# Patient Record
Sex: FEMALE | Race: ASIAN | NOT HISPANIC OR LATINO
[De-identification: names, ages, dates, MRNs, and addresses within clinical notes are randomized per-mention and may not be internally consistent; named-entity substitution may affect disease eponyms.]

---

## 2017-02-28 ENCOUNTER — TRANSCRIPTION ENCOUNTER (OUTPATIENT)
Age: 32
End: 2017-02-28

## 2018-10-19 ENCOUNTER — TRANSCRIPTION ENCOUNTER (OUTPATIENT)
Age: 33
End: 2018-10-19

## 2018-12-05 ENCOUNTER — APPOINTMENT (OUTPATIENT)
Dept: UROGYNECOLOGY | Facility: CLINIC | Age: 33
End: 2018-12-05

## 2019-01-23 ENCOUNTER — TRANSCRIPTION ENCOUNTER (OUTPATIENT)
Age: 34
End: 2019-01-23

## 2019-04-19 ENCOUNTER — EMERGENCY (EMERGENCY)
Facility: HOSPITAL | Age: 34
LOS: 1 days | Discharge: HOME | End: 2019-04-19
Attending: EMERGENCY MEDICINE | Admitting: EMERGENCY MEDICINE
Payer: COMMERCIAL

## 2019-04-19 VITALS
OXYGEN SATURATION: 99 % | TEMPERATURE: 98 F | SYSTOLIC BLOOD PRESSURE: 129 MMHG | DIASTOLIC BLOOD PRESSURE: 84 MMHG | HEIGHT: 64 IN | WEIGHT: 162.92 LBS | RESPIRATION RATE: 18 BRPM | HEART RATE: 80 BPM

## 2019-04-19 PROCEDURE — 99284 EMERGENCY DEPT VISIT MOD MDM: CPT | Mod: 25

## 2019-04-20 VITALS
TEMPERATURE: 98 F | RESPIRATION RATE: 16 BRPM | HEART RATE: 62 BPM | DIASTOLIC BLOOD PRESSURE: 80 MMHG | OXYGEN SATURATION: 100 % | SYSTOLIC BLOOD PRESSURE: 127 MMHG

## 2019-04-20 LAB
ANION GAP SERPL CALC-SCNC: 10 MMOL/L — SIGNIFICANT CHANGE UP (ref 5–17)
BASOPHILS # BLD AUTO: 0.02 K/UL — SIGNIFICANT CHANGE UP (ref 0–0.2)
BASOPHILS NFR BLD AUTO: 0.3 % — SIGNIFICANT CHANGE UP (ref 0–2)
BUN SERPL-MCNC: 18 MG/DL — SIGNIFICANT CHANGE UP (ref 7–23)
CALCIUM SERPL-MCNC: 9.7 MG/DL — SIGNIFICANT CHANGE UP (ref 8.4–10.5)
CHLORIDE SERPL-SCNC: 103 MMOL/L — SIGNIFICANT CHANGE UP (ref 96–108)
CO2 SERPL-SCNC: 26 MMOL/L — SIGNIFICANT CHANGE UP (ref 22–31)
CREAT SERPL-MCNC: 0.72 MG/DL — SIGNIFICANT CHANGE UP (ref 0.5–1.3)
EOSINOPHIL # BLD AUTO: 0.06 K/UL — SIGNIFICANT CHANGE UP (ref 0–0.5)
EOSINOPHIL NFR BLD AUTO: 0.9 % — SIGNIFICANT CHANGE UP (ref 0–6)
GLUCOSE SERPL-MCNC: 103 MG/DL — HIGH (ref 70–99)
HCT VFR BLD CALC: 38.6 % — SIGNIFICANT CHANGE UP (ref 34.5–45)
HGB BLD-MCNC: 12.6 G/DL — SIGNIFICANT CHANGE UP (ref 11.5–15.5)
IMM GRANULOCYTES NFR BLD AUTO: 0.3 % — SIGNIFICANT CHANGE UP (ref 0–1.5)
LYMPHOCYTES # BLD AUTO: 2.39 K/UL — SIGNIFICANT CHANGE UP (ref 1–3.3)
LYMPHOCYTES # BLD AUTO: 37.3 % — SIGNIFICANT CHANGE UP (ref 13–44)
MCHC RBC-ENTMCNC: 30.5 PG — SIGNIFICANT CHANGE UP (ref 27–34)
MCHC RBC-ENTMCNC: 32.6 GM/DL — SIGNIFICANT CHANGE UP (ref 32–36)
MCV RBC AUTO: 93.5 FL — SIGNIFICANT CHANGE UP (ref 80–100)
MONOCYTES # BLD AUTO: 0.32 K/UL — SIGNIFICANT CHANGE UP (ref 0–0.9)
MONOCYTES NFR BLD AUTO: 5 % — SIGNIFICANT CHANGE UP (ref 2–14)
NEUTROPHILS # BLD AUTO: 3.59 K/UL — SIGNIFICANT CHANGE UP (ref 1.8–7.4)
NEUTROPHILS NFR BLD AUTO: 56.2 % — SIGNIFICANT CHANGE UP (ref 43–77)
NRBC # BLD: 0 /100 WBCS — SIGNIFICANT CHANGE UP (ref 0–0)
PLATELET # BLD AUTO: 451 K/UL — HIGH (ref 150–400)
POTASSIUM SERPL-MCNC: 4.4 MMOL/L — SIGNIFICANT CHANGE UP (ref 3.5–5.3)
POTASSIUM SERPL-SCNC: 4.4 MMOL/L — SIGNIFICANT CHANGE UP (ref 3.5–5.3)
RBC # BLD: 4.13 M/UL — SIGNIFICANT CHANGE UP (ref 3.8–5.2)
RBC # FLD: 13.2 % — SIGNIFICANT CHANGE UP (ref 10.3–14.5)
SODIUM SERPL-SCNC: 139 MMOL/L — SIGNIFICANT CHANGE UP (ref 135–145)
WBC # BLD: 6.4 K/UL — SIGNIFICANT CHANGE UP (ref 3.8–10.5)
WBC # FLD AUTO: 6.4 K/UL — SIGNIFICANT CHANGE UP (ref 3.8–10.5)

## 2019-04-20 PROCEDURE — 70450 CT HEAD/BRAIN W/O DYE: CPT

## 2019-04-20 PROCEDURE — 99284 EMERGENCY DEPT VISIT MOD MDM: CPT | Mod: 25

## 2019-04-20 PROCEDURE — 96374 THER/PROPH/DIAG INJ IV PUSH: CPT

## 2019-04-20 PROCEDURE — 80048 BASIC METABOLIC PNL TOTAL CA: CPT

## 2019-04-20 PROCEDURE — 36415 COLL VENOUS BLD VENIPUNCTURE: CPT

## 2019-04-20 PROCEDURE — 96375 TX/PRO/DX INJ NEW DRUG ADDON: CPT

## 2019-04-20 PROCEDURE — 70450 CT HEAD/BRAIN W/O DYE: CPT | Mod: 26

## 2019-04-20 PROCEDURE — 85025 COMPLETE CBC W/AUTO DIFF WBC: CPT

## 2019-04-20 RX ORDER — SODIUM CHLORIDE 9 MG/ML
1000 INJECTION INTRAMUSCULAR; INTRAVENOUS; SUBCUTANEOUS ONCE
Qty: 0 | Refills: 0 | Status: COMPLETED | OUTPATIENT
Start: 2019-04-20 | End: 2019-04-20

## 2019-04-20 RX ORDER — DIPHENHYDRAMINE HCL 50 MG
25 CAPSULE ORAL ONCE
Qty: 0 | Refills: 0 | Status: COMPLETED | OUTPATIENT
Start: 2019-04-20 | End: 2019-04-20

## 2019-04-20 RX ORDER — KETOROLAC TROMETHAMINE 30 MG/ML
30 SYRINGE (ML) INJECTION ONCE
Qty: 0 | Refills: 0 | Status: DISCONTINUED | OUTPATIENT
Start: 2019-04-20 | End: 2019-04-20

## 2019-04-20 RX ORDER — PROCHLORPERAZINE MALEATE 5 MG
10 TABLET ORAL ONCE
Qty: 0 | Refills: 0 | Status: COMPLETED | OUTPATIENT
Start: 2019-04-20 | End: 2019-04-20

## 2019-04-20 RX ORDER — ONDANSETRON 8 MG/1
4 TABLET, FILM COATED ORAL ONCE
Qty: 0 | Refills: 0 | Status: COMPLETED | OUTPATIENT
Start: 2019-04-20 | End: 2019-04-20

## 2019-04-20 RX ORDER — METOCLOPRAMIDE HCL 10 MG
10 TABLET ORAL ONCE
Qty: 0 | Refills: 0 | Status: COMPLETED | OUTPATIENT
Start: 2019-04-20 | End: 2019-04-20

## 2019-04-20 RX ADMIN — SODIUM CHLORIDE 1000 MILLILITER(S): 9 INJECTION INTRAMUSCULAR; INTRAVENOUS; SUBCUTANEOUS at 01:26

## 2019-04-20 RX ADMIN — ONDANSETRON 4 MILLIGRAM(S): 8 TABLET, FILM COATED ORAL at 01:10

## 2019-04-20 RX ADMIN — Medication 30 MILLIGRAM(S): at 02:46

## 2019-04-20 RX ADMIN — Medication 104 MILLIGRAM(S): at 04:27

## 2019-04-20 RX ADMIN — Medication 25 MILLIGRAM(S): at 02:47

## 2019-04-20 RX ADMIN — Medication 10 MILLIGRAM(S): at 01:24

## 2019-04-20 NOTE — ED PROVIDER NOTE - CLINICAL SUMMARY MEDICAL DECISION MAKING FREE TEXT BOX
Patient in ED w concern for HA - notes history of migraine HA.  No focal neuro deficits.  Non toxic.  5/5 strength x 4 extremities against gravity and external force.  CT head, basic labs completed.  Pt given zofran, compazine.  She just took motrin and is offered benadryl, however declines.  Following completion of my shift patient's care is handed over to oncoming night team pending CT scan and re eval for final disposition.  Patient is stable at time of transfer of care.

## 2019-04-20 NOTE — ED PROVIDER NOTE - OBJECTIVE STATEMENT
33 year old female with history of migraine HA presents to ED with concern for HA x 6 days.  Patient states she was recently diagnosed with migraine HAs associated with her menstrual cycle, stating she has not had any imaging completed to date since diagnosis several months ago.  She notes associated nausea, emesis and photophobia.  Patient states HA is similar to what she has experienced in the past, however she does not typically have symptoms that last this long.  She denies associated fever, chills, neck pain/stiffness, extremity weakness, chest pain, shortness of breath, nausea, emesis, changes to bowel movements or any additional acute complaints or concerns at this time.

## 2019-04-20 NOTE — ED ADULT NURSE NOTE - OBJECTIVE STATEMENT
34 y/o pt AOx3 arrived to the ED c/o having a headache since sunday after having traveled. Pt states she has been suffering from headaches for the past 2 months and they have been coming during her menstruation and normally go away once it's done, but during this episode it continues. Pt states she currently takes lexapro 20mg for the past 6yrs.

## 2019-04-20 NOTE — ED PROVIDER NOTE - CARE PROVIDER_API CALL
Yolie Cody)  Neurology; Vascular Neurology  [address], [address] [place] [address]  Phone: [phone]  Fax: [phone]  Follow Up Time:

## 2019-04-21 ENCOUNTER — EMERGENCY (EMERGENCY)
Facility: HOSPITAL | Age: 34
LOS: 1 days | Discharge: HOME | End: 2019-04-21
Attending: EMERGENCY MEDICINE | Admitting: EMERGENCY MEDICINE
Payer: COMMERCIAL

## 2019-04-21 VITALS
OXYGEN SATURATION: 100 % | WEIGHT: 162.04 LBS | TEMPERATURE: 98 F | HEART RATE: 84 BPM | DIASTOLIC BLOOD PRESSURE: 90 MMHG | SYSTOLIC BLOOD PRESSURE: 159 MMHG | RESPIRATION RATE: 16 BRPM

## 2019-04-21 VITALS
HEART RATE: 78 BPM | SYSTOLIC BLOOD PRESSURE: 119 MMHG | RESPIRATION RATE: 18 BRPM | OXYGEN SATURATION: 98 % | TEMPERATURE: 98 F | DIASTOLIC BLOOD PRESSURE: 79 MMHG

## 2019-04-21 DIAGNOSIS — R51 HEADACHE: ICD-10-CM

## 2019-04-21 DIAGNOSIS — R11.0 NAUSEA: ICD-10-CM

## 2019-04-21 DIAGNOSIS — Z88.1 ALLERGY STATUS TO OTHER ANTIBIOTIC AGENTS STATUS: ICD-10-CM

## 2019-04-21 PROCEDURE — 96375 TX/PRO/DX INJ NEW DRUG ADDON: CPT

## 2019-04-21 PROCEDURE — 99284 EMERGENCY DEPT VISIT MOD MDM: CPT

## 2019-04-21 PROCEDURE — 99284 EMERGENCY DEPT VISIT MOD MDM: CPT | Mod: 25

## 2019-04-21 PROCEDURE — 96365 THER/PROPH/DIAG IV INF INIT: CPT

## 2019-04-21 PROCEDURE — 96361 HYDRATE IV INFUSION ADD-ON: CPT

## 2019-04-21 RX ORDER — KETOROLAC TROMETHAMINE 30 MG/ML
15 SYRINGE (ML) INJECTION ONCE
Qty: 0 | Refills: 0 | Status: DISCONTINUED | OUTPATIENT
Start: 2019-04-21 | End: 2019-04-21

## 2019-04-21 RX ORDER — SODIUM CHLORIDE 9 MG/ML
1000 INJECTION INTRAMUSCULAR; INTRAVENOUS; SUBCUTANEOUS ONCE
Qty: 0 | Refills: 0 | Status: COMPLETED | OUTPATIENT
Start: 2019-04-21 | End: 2019-04-21

## 2019-04-21 RX ORDER — METOCLOPRAMIDE HCL 10 MG
10 TABLET ORAL ONCE
Qty: 0 | Refills: 0 | Status: COMPLETED | OUTPATIENT
Start: 2019-04-21 | End: 2019-04-21

## 2019-04-21 RX ORDER — DEXAMETHASONE 0.5 MG/5ML
10 ELIXIR ORAL ONCE
Qty: 0 | Refills: 0 | Status: COMPLETED | OUTPATIENT
Start: 2019-04-21 | End: 2019-04-21

## 2019-04-21 RX ORDER — DIPHENHYDRAMINE HCL 50 MG
25 CAPSULE ORAL ONCE
Qty: 0 | Refills: 0 | Status: COMPLETED | OUTPATIENT
Start: 2019-04-21 | End: 2019-04-21

## 2019-04-21 RX ADMIN — Medication 15 MILLIGRAM(S): at 21:08

## 2019-04-21 RX ADMIN — SODIUM CHLORIDE 2000 MILLILITER(S): 9 INJECTION INTRAMUSCULAR; INTRAVENOUS; SUBCUTANEOUS at 21:02

## 2019-04-21 RX ADMIN — Medication 10 MILLIGRAM(S): at 21:08

## 2019-04-21 RX ADMIN — SODIUM CHLORIDE 1000 MILLILITER(S): 9 INJECTION INTRAMUSCULAR; INTRAVENOUS; SUBCUTANEOUS at 21:56

## 2019-04-21 RX ADMIN — Medication 15 MILLIGRAM(S): at 21:37

## 2019-04-21 RX ADMIN — Medication 10 MILLIGRAM(S): at 22:39

## 2019-04-21 RX ADMIN — Medication 25 MILLIGRAM(S): at 21:07

## 2019-04-21 RX ADMIN — Medication 102 MILLIGRAM(S): at 21:56

## 2019-04-21 NOTE — ED PROVIDER NOTE - CLINICAL SUMMARY MEDICAL DECISION MAKING FREE TEXT BOX
recurrent headache.  recent visit for same with normal labs/ ct scan.  exam non focal.  suspect tension /migraine headache.  repeated reglan/ toradol/ benadryl with improvement.  decadron given for possible rebound prophylaxis.  discussed importance of outpatient f/u w neurologist, possible mri if persists.  return precautions discussed

## 2019-04-21 NOTE — ED ADULT NURSE NOTE - NSIMPLEMENTINTERV_GEN_ALL_ED
Implemented All Universal Safety Interventions:  [place] to call system. Call bell, personal items and telephone within reach. Instruct patient to call for assistance. Room bathroom lighting operational. Non-slip footwear when patient is off stretcher. Physically safe environment: no spills, clutter or unnecessary equipment. Stretcher in lowest position, wheels locked, appropriate side rails in place.

## 2019-04-21 NOTE — ED PROVIDER NOTE - CARE PROVIDER_API CALL
Margret Okeefe)  Neurology  [address], [address] [place] [address]  Phone: [phone]  Fax: [phone]  Follow Up Time:

## 2019-04-21 NOTE — ED PROVIDER NOTE - NEUROLOGICAL, MLM
Alert and oriented, no focal deficits, no motor or sensory deficits.  CN 2-12 intact, finger to nose normal, speech normal, rhomberg neg

## 2019-04-21 NOTE — ED PROVIDER NOTE - NSFOLLOWUPINSTRUCTIONS_ED_ALL_ED_FT
Please see referral to neurologist  Return to the ER if symptoms worsen or other concerns.    Headache    A headache is pain or discomfort felt around the head or neck area. The specific cause of a headache may not be found as there are many types including tension headaches, migraine headaches, and cluster headaches. Watch your condition for any changes. Things you can do to manage your pain include taking over the counter and prescription medications as instructed by your health care provider, lying down in a dark quiet room, limiting stress, getting regular sleep, and refraining from alcohol and tobacco products.    SEEK IMMEDIATE MEDICAL CARE IF YOU HAVE ANY OF THE FOLLOWING SYMPTOMS: fever, vomiting, stiff neck, loss of vision, problems with speech, muscle weakness, loss of balance, trouble walking, passing out, or confusion.

## 2019-04-21 NOTE — ED ADULT NURSE NOTE - CHPI ED NUR SYMPTOMS NEG
no dizziness/no fever/no nausea/no blurred vision/no numbness/no vomiting/no confusion/no loss of consciousness/no change in level of consciousness

## 2019-04-21 NOTE — ED ADULT TRIAGE NOTE - OTHER COMPLAINTS
headache x1 week. reports previously evaluated with CT. no numbness nor tingling, no facial droop, no slurring of speech, strength and sensation equal bilateral. no fever, no nuchal rigidity.

## 2019-04-21 NOTE — ED ADULT NURSE NOTE - OBJECTIVE STATEMENT
Pt w/o significant PMH presents to ED today c/o 10/10 HA x1 week.  Pt has been evaluated for same complaint this week and had CT performed w/ negative findings.  Pt denies falls, LOC, or N/V.  Pt has limited photophobia.  Pt is neurologically intact.  Pt pending treatments.

## 2019-04-21 NOTE — ED ADULT NURSE REASSESSMENT NOTE - NS ED NURSE REASSESS COMMENT FT1
Headache and symptoms resolved s/p meds, vital signs stable, discharged to home in stable condition.

## 2019-04-21 NOTE — ED PROVIDER NOTE - OBJECTIVE STATEMENT
here with persistent / recurrent headache.  Described as pressure from back of head to front.  Associated with nausea.  Has been present for a week.  Seen 2 days ago for same, treated with medicines which helped but then when waking next day headache recurred.  Denies trauma, fever/chills, vision changes, weakness.  Gradual onset.

## 2019-04-22 ENCOUNTER — INBOUND DOCUMENT (OUTPATIENT)
Age: 34
End: 2019-04-22

## 2019-04-23 DIAGNOSIS — Z88.1 ALLERGY STATUS TO OTHER ANTIBIOTIC AGENTS STATUS: ICD-10-CM

## 2019-04-23 DIAGNOSIS — R51 HEADACHE: ICD-10-CM

## 2019-04-23 DIAGNOSIS — R11.0 NAUSEA: ICD-10-CM

## 2019-04-23 DIAGNOSIS — H53.149 VISUAL DISCOMFORT, UNSPECIFIED: ICD-10-CM

## 2019-09-05 ENCOUNTER — INBOUND DOCUMENT (OUTPATIENT)
Age: 34
End: 2019-09-05

## 2019-09-05 ENCOUNTER — EMERGENCY (EMERGENCY)
Facility: HOSPITAL | Age: 34
LOS: 1 days | Discharge: HOME | End: 2019-09-05
Attending: EMERGENCY MEDICINE | Admitting: EMERGENCY MEDICINE
Payer: COMMERCIAL

## 2019-09-05 VITALS
HEART RATE: 76 BPM | TEMPERATURE: 98 F | HEIGHT: 64 IN | RESPIRATION RATE: 18 BRPM | DIASTOLIC BLOOD PRESSURE: 87 MMHG | WEIGHT: 152.56 LBS | SYSTOLIC BLOOD PRESSURE: 137 MMHG | OXYGEN SATURATION: 100 %

## 2019-09-05 PROCEDURE — 99284 EMERGENCY DEPT VISIT MOD MDM: CPT | Mod: 25

## 2019-09-05 PROCEDURE — 96374 THER/PROPH/DIAG INJ IV PUSH: CPT

## 2019-09-05 PROCEDURE — 99284 EMERGENCY DEPT VISIT MOD MDM: CPT

## 2019-09-05 PROCEDURE — 96375 TX/PRO/DX INJ NEW DRUG ADDON: CPT

## 2019-09-05 RX ORDER — PROCHLORPERAZINE MALEATE 5 MG
10 TABLET ORAL ONCE
Refills: 0 | Status: DISCONTINUED | OUTPATIENT
Start: 2019-09-05 | End: 2019-09-05

## 2019-09-05 RX ORDER — SODIUM CHLORIDE 9 MG/ML
1000 INJECTION INTRAMUSCULAR; INTRAVENOUS; SUBCUTANEOUS ONCE
Refills: 0 | Status: COMPLETED | OUTPATIENT
Start: 2019-09-05 | End: 2019-09-05

## 2019-09-05 RX ORDER — KETOROLAC TROMETHAMINE 30 MG/ML
30 SYRINGE (ML) INJECTION ONCE
Refills: 0 | Status: DISCONTINUED | OUTPATIENT
Start: 2019-09-05 | End: 2019-09-05

## 2019-09-05 RX ORDER — PROCHLORPERAZINE MALEATE 5 MG
10 TABLET ORAL ONCE
Refills: 0 | Status: COMPLETED | OUTPATIENT
Start: 2019-09-05 | End: 2019-09-05

## 2019-09-05 RX ORDER — ACETAMINOPHEN 500 MG
650 TABLET ORAL ONCE
Refills: 0 | Status: COMPLETED | OUTPATIENT
Start: 2019-09-05 | End: 2019-09-05

## 2019-09-05 RX ORDER — DIPHENHYDRAMINE HCL 50 MG
25 CAPSULE ORAL ONCE
Refills: 0 | Status: COMPLETED | OUTPATIENT
Start: 2019-09-05 | End: 2019-09-05

## 2019-09-05 RX ORDER — PROCHLORPERAZINE MALEATE 5 MG
1 TABLET ORAL
Qty: 6 | Refills: 0
Start: 2019-09-05 | End: 2019-09-06

## 2019-09-05 RX ADMIN — Medication 104 MILLIGRAM(S): at 15:43

## 2019-09-05 RX ADMIN — Medication 25 MILLIGRAM(S): at 15:35

## 2019-09-05 RX ADMIN — Medication 650 MILLIGRAM(S): at 15:34

## 2019-09-05 RX ADMIN — SODIUM CHLORIDE 2000 MILLILITER(S): 9 INJECTION INTRAMUSCULAR; INTRAVENOUS; SUBCUTANEOUS at 15:35

## 2019-09-05 RX ADMIN — Medication 30 MILLIGRAM(S): at 15:35

## 2019-09-05 NOTE — ED ADULT NURSE NOTE - NSIMPLEMENTINTERV_GEN_ALL_ED
Implemented All Universal Safety Interventions:  [place] to call system. Call bell, personal items and telephone within reach. Instruct patient to call for assistance. Room bathroom lighting operational. Non-slip footwear when patient is off stretcher. Physically safe environment: no spills, clutter or unnecessary equipment. Stretcher in lowest position, wheels locked, appropriate side rails in place.

## 2019-09-05 NOTE — ED PROVIDER NOTE - CLINICAL SUMMARY MEDICAL DECISION MAKING FREE TEXT BOX
34 y/o F with persistent headaches with imaging as above in HPI, will treat with IV medications and reevaluate. No focal neurological deficits.

## 2019-09-05 NOTE — ED ADULT NURSE NOTE - CHPI ED NUR SYMPTOMS NEG
no dizziness/no change in level of consciousness/no nausea/no blurred vision/no fever/no numbness/no vomiting/no loss of consciousness/no weakness/no confusion

## 2019-09-05 NOTE — ED PROVIDER NOTE - NSFOLLOWUPINSTRUCTIONS_ED_ALL_ED_FT
Acute Headache    WHAT YOU NEED TO KNOW:    An acute headache is pain or discomfort that starts suddenly and gets worse quickly. You may have an acute headache only when you feel stress or eat certain foods. Other acute headache pain can happen every day, and sometimes several times a day.     DISCHARGE INSTRUCTIONS:    Seek care immediately if:     You have severe pain.      You have numbness or weakness on one side of your face or body.      You have a headache that occurs after a blow to the head, a fall, or other trauma.       You have a headache, are forgetful or confused, or have trouble speaking.      You have a headache, stiff neck, and a fever.    Contact your healthcare provider if:     You have a constant headache and are vomiting.      You have a headache each day that does not get better, even after treatment.      You have changes in your headaches, or new symptoms that occur when you have a headache.      You have questions or concerns about your condition or care.    Medicines: You may need any of the following:     Prescription pain medicine may be given. The medicine your healthcare provider recommends will depend on the kind of headaches you have. You will need to take prescription headache medicines as directed to prevent a problem called rebound headache. These headaches happen with regular use of pain relievers for headache disorders.      NSAIDs, such as ibuprofen, help decrease swelling, pain, and fever. This medicine is available with or without a doctor's order. NSAIDs can cause stomach bleeding or kidney problems in certain people. If you take blood thinner medicine, always ask your healthcare provider if NSAIDs are safe for you. Always read the medicine label and follow directions.      Acetaminophen decreases pain and fever. It is available without a doctor's order. Ask how much to take and how often to take it. Follow directions. Read the labels of all other medicines you are using to see if they also contain acetaminophen, or ask your doctor or pharmacist. Acetaminophen can cause liver damage if not taken correctly. Do not use more than 3 grams (3,000 milligrams) total of acetaminophen in one day.       Antidepressants may be given for some kinds of headaches.       Take your medicine as directed. Contact your healthcare provider if you think your medicine is not helping or if you have side effects. Tell him or her if you are allergic to any medicine. Keep a list of the medicines, vitamins, and herbs you take. Include the amounts, and when and why you take them. Bring the list or the pill bottles to follow-up visits. Carry your medicine list with you in case of an emergency.    Manage your symptoms:     Apply heat or ice on the headache area. Use a heat or ice pack. For an ice pack, you can also put crushed ice in a plastic bag. Cover the pack or bag with a towel before you apply it to your skin. Ice and heat both help decrease pain, and heat also helps decrease muscle spasms. Apply heat for 20 to 30 minutes every 2 hours. Apply ice for 15 to 20 minutes every hour. Apply heat or ice for as long and for as many days as directed. You may alternate heat and ice.      Relax your muscles. Lie down in a comfortable position and close your eyes. Relax your muscles slowly. Start at your toes and work your way up your body.      Keep a record of your headaches. Write down when your headaches start and stop. Include your symptoms and what you were doing when the headache began. Record what you ate or drank for 24 hours before the headache started. Describe the pain and where it hurts. Keep track of what you did to treat your headache and if it worked.     Prevent an acute headache:     Avoid anything that triggers an acute headache. Examples include exposure to chemicals, going to high altitude, or not getting enough sleep. Create a regular sleep routine. Go to sleep at the same time and wake up at the same time each day. Do not use electronic devices before bedtime. These may trigger a headache or prevent you from sleeping well.      Do not smoke. Nicotine and other chemicals in cigarettes and cigars can trigger an acute headache or make it worse. Ask your healthcare provider for information if you currently smoke and need help to quit. E-cigarettes or smokeless tobacco still contain nicotine. Talk to your healthcare provider before you use these products.       Limit alcohol as directed. Alcohol can trigger an acute headache or make it worse. If you have cluster headaches, do not drink alcohol during an episode. For other types of headaches, ask your healthcare provider if it is safe for you to drink alcohol. Ask how much is safe for you to drink, and how often.      Exercise as directed. Exercise can reduce tension and help with headache pain. Aim for 30 minutes of physical activity on most days of the week. Your healthcare provider can help you create an exercise plan.      Eat a variety of healthy foods. Healthy foods include fruits, vegetables, low-fat dairy products, lean meats, fish, whole grains, and cooked beans. Your healthcare provider or dietitian can help you create meals plans if you need to avoid foods that trigger headaches.    Follow up with your healthcare provider as directed: Bring your headache record with you when you see your healthcare provider. Write down your questions so you remember to ask them during your visits.       © Copyright [employer] 2019 All illustrations and images included in CareNotes are the copyrighted property of [employer] or [employer].

## 2019-09-05 NOTE — ED PROVIDER NOTE - PATIENT PORTAL LINK FT
You can access the FollowMyHealth Patient Portal offered by [facility] by registering at the following website: [email or web address]. By joining [employer]’s FollowMyHealth portal, you will also be able to view your health information using other applications (apps) compatible with our system.

## 2019-09-05 NOTE — ED PROVIDER NOTE - CARE PROVIDER_API CALL
Yolie Cody)  Neurology; Vascular Neurology  [address], [address] [place] [address]  Phone: [phone]  Fax: [phone]  Follow Up Time:

## 2019-09-05 NOTE — ED ADULT NURSE NOTE - OBJECTIVE STATEMENT
Patient AOx4 c/o of posterior HA x 4 months with symptoms worsening---denies nausea/vomiting. No neuro deficits noted. Answering questions and following verbal commands appropriately. No slurred speech noted.

## 2019-09-05 NOTE — ED ADULT TRIAGE NOTE - CHIEF COMPLAINT QUOTE
chronic posterior HA and nothing is helpinig -- has seen neuro and had nerve block yesterday --- also tramadol is not helping --- associated nausea chronic posterior HA and nothing is helping -- has seen neuro and had nerve block yesterday --- also tramadol is not helping --- associated nausea

## 2019-09-05 NOTE — ED ADULT NURSE NOTE - CHIEF COMPLAINT QUOTE
chronic posterior HA and nothing is helping -- has seen neuro and had nerve block yesterday --- also tramadol is not helping --- associated nausea

## 2019-09-05 NOTE — ED PROVIDER NOTE - PROGRESS NOTE DETAILS
ambulating, in ED NAD, will rx few tablets compazine in interim as stated helped here, pt stated tried sumatriptan in past but did not help. Will  meds rx  by neurologist tomorrow in pharmacy.

## 2019-09-05 NOTE — ED PROVIDER NOTE - OBJECTIVE STATEMENT
34 y/o F with Hx of headache x 4 days, worked up at outpatient for c5-c6 Cervical radiculopathy, MRI done in May showing degenerative disease of C5-C6. Patient presents today with persistent headache, described as being in the back of her head and radiating forward. No associated fever, chills, neck pain, weakness, vision changes, nausea, vomiting, diarrhea. Prescribed Tramadol in the last month, up to 200mg/day, not relieving symptoms. Receives occipital nerve blocks every 3 weeks. Visited neurologist this morning, prescribed pregabalin and Metaxalone. Patient states that the nature of her headaches has been the same over the last 4 months, including today, but symptoms are worsening. 34 y/o F with Hx of headaches x 4 months, worked up as outpatient, dx of c5-c6 Cervical radiculopathy by MRI done in May showing degenerative disease of C5-C6. Patient presents today with persistent headache, described as being in the back of her head and radiating forward. No associated fever, chills, neck pain, weakness, vision changes, nausea, vomiting, diarrhea. Prescribed Tramadol in the last month, up to 200mg/day, not relieving symptoms. Receives occipital nerve blocks every 3 weeks. Visited neurologist this morning, prescribed pregabalin and Metaxalone. Patient states that the nature of her headaches has been the same over the last 4 months, including today.

## 2019-09-11 DIAGNOSIS — R51 HEADACHE: ICD-10-CM

## 2019-12-28 ENCOUNTER — EMERGENCY (EMERGENCY)
Facility: HOSPITAL | Age: 34
LOS: 1 days | Discharge: HOME | End: 2019-12-28
Attending: EMERGENCY MEDICINE | Admitting: EMERGENCY MEDICINE
Payer: COMMERCIAL

## 2019-12-28 VITALS
HEART RATE: 80 BPM | RESPIRATION RATE: 17 BRPM | SYSTOLIC BLOOD PRESSURE: 118 MMHG | TEMPERATURE: 98 F | OXYGEN SATURATION: 98 % | DIASTOLIC BLOOD PRESSURE: 82 MMHG

## 2019-12-28 VITALS
OXYGEN SATURATION: 100 % | SYSTOLIC BLOOD PRESSURE: 141 MMHG | HEIGHT: 64 IN | HEART RATE: 82 BPM | TEMPERATURE: 98 F | RESPIRATION RATE: 16 BRPM | DIASTOLIC BLOOD PRESSURE: 89 MMHG | WEIGHT: 153 LBS

## 2019-12-28 DIAGNOSIS — G43.909 MIGRAINE, UNSPECIFIED, NOT INTRACTABLE, WITHOUT STATUS MIGRAINOSUS: ICD-10-CM

## 2019-12-28 DIAGNOSIS — Z79.899 OTHER LONG TERM (CURRENT) DRUG THERAPY: ICD-10-CM

## 2019-12-28 DIAGNOSIS — Z88.8 ALLERGY STATUS TO OTHER DRUGS, MEDICAMENTS AND BIOLOGICAL SUBSTANCES STATUS: ICD-10-CM

## 2019-12-28 DIAGNOSIS — M54.2 CERVICALGIA: ICD-10-CM

## 2019-12-28 DIAGNOSIS — R51 HEADACHE: ICD-10-CM

## 2019-12-28 PROCEDURE — 99284 EMERGENCY DEPT VISIT MOD MDM: CPT

## 2019-12-28 PROCEDURE — 99284 EMERGENCY DEPT VISIT MOD MDM: CPT | Mod: 25

## 2019-12-28 PROCEDURE — 96375 TX/PRO/DX INJ NEW DRUG ADDON: CPT

## 2019-12-28 PROCEDURE — 96374 THER/PROPH/DIAG INJ IV PUSH: CPT

## 2019-12-28 PROCEDURE — 96361 HYDRATE IV INFUSION ADD-ON: CPT

## 2019-12-28 RX ORDER — SODIUM CHLORIDE 9 MG/ML
1000 INJECTION INTRAMUSCULAR; INTRAVENOUS; SUBCUTANEOUS ONCE
Refills: 0 | Status: COMPLETED | OUTPATIENT
Start: 2019-12-28 | End: 2019-12-28

## 2019-12-28 RX ORDER — DIPHENHYDRAMINE HCL 50 MG
50 CAPSULE ORAL ONCE
Refills: 0 | Status: COMPLETED | OUTPATIENT
Start: 2019-12-28 | End: 2019-12-28

## 2019-12-28 RX ORDER — METOCLOPRAMIDE HCL 10 MG
10 TABLET ORAL ONCE
Refills: 0 | Status: COMPLETED | OUTPATIENT
Start: 2019-12-28 | End: 2019-12-28

## 2019-12-28 RX ORDER — KETOROLAC TROMETHAMINE 30 MG/ML
30 SYRINGE (ML) INJECTION ONCE
Refills: 0 | Status: DISCONTINUED | OUTPATIENT
Start: 2019-12-28 | End: 2019-12-28

## 2019-12-28 RX ADMIN — SODIUM CHLORIDE 2000 MILLILITER(S): 9 INJECTION INTRAMUSCULAR; INTRAVENOUS; SUBCUTANEOUS at 22:14

## 2019-12-28 RX ADMIN — Medication 50 MILLIGRAM(S): at 22:15

## 2019-12-28 RX ADMIN — Medication 30 MILLIGRAM(S): at 22:41

## 2019-12-28 RX ADMIN — Medication 30 MILLIGRAM(S): at 22:15

## 2019-12-28 RX ADMIN — SODIUM CHLORIDE 1000 MILLILITER(S): 9 INJECTION INTRAMUSCULAR; INTRAVENOUS; SUBCUTANEOUS at 22:46

## 2019-12-28 RX ADMIN — Medication 10 MILLIGRAM(S): at 22:14

## 2019-12-28 NOTE — ED PROVIDER NOTE - PATIENT PORTAL LINK FT
You can access the FollowMyHealth Patient Portal offered by [facility] by registering at the following website: [email or web address]. By joining [employer]’s FollowMyHealth portal, you will also be able to view your health information using other applications (apps) compatible with our system.

## 2019-12-28 NOTE — ED PROVIDER NOTE - CARE PROVIDER_API CALL
Ricci Rees)  Neurology; Neuromuscular Medicine  [address], [address] [place] [address]  Phone: [phone]  Fax: [phone]  Follow Up Time:

## 2019-12-28 NOTE — ED ADULT NURSE NOTE - OBJECTIVE STATEMENT
Patient w/ Hx. of migraine headaches, c/o of 2 days migraine headache, states had on NOv. 22 MRI, negative results and on Nov. 29  Lumbar puncture done w/ CSF leak, no neuro deficits, w/ nausea, no vomitting.

## 2019-12-28 NOTE — ED ADULT NURSE REASSESSMENT NOTE - NS ED NURSE REASSESS COMMENT FT1
Patient a/oX 3, c/o of migraine type headache, w/ nausea, no vomitting, no neuro deficits, no photophobia.  Vital signs stable.  Right AC PIV #22 in place.  Adminsitered:  Bendadryl, Reglan, Toradol IVP, NSS 1 L bolus.  Disposition pending.

## 2019-12-28 NOTE — ED PROVIDER NOTE - NSFOLLOWUPINSTRUCTIONS_ED_ALL_ED_FT
General Headache Without Cause  A headache is pain or discomfort felt around the head or neck area. The specific cause of a headache may not be found. There are many causes and types of headaches. A few common ones are:  Tension headaches.Migraine headaches.Cluster headaches.Chronic daily headaches.Follow these instructions at home:  Watch your condition for any changes. Let your health care provider know about them. Take these steps to help with your condition:  Managing pain               Take over-the-counter and prescription medicines only as told by your health care provider.Lie down in a dark, quiet room when you have a headache.If directed, put ice on your head and neck area:  Put ice in a plastic bag.Place a towel between your skin and the bag.Leave the ice on for 20 minutes, 2–3 times per day.If directed, apply heat to the affected area. Use the heat source that your health care provider recommends, such as a moist heat pack or a heating pad.  Place a towel between your skin and the heat source.Leave the heat on for 20–30 minutes.Remove the heat if your skin turns bright red. This is especially important if you are unable to feel pain, heat, or cold. You may have a greater risk of getting burned.Keep lights dim if bright lights bother you or make your headaches worse.Eating and drinking     Eat meals on a regular schedule.If you drink alcohol:  Limit how much you use to:   0–1 drink a day for women. 0–2 drinks a day for men. Be aware of how much alcohol is in your drink. In the [place] one drink equals one 12 oz bottle of beer (355 mL), one 5 oz glass of wine (148 mL), or one 1½ oz glass of hard liquor (44 mL).Stop drinking caffeine, or decrease the amount of caffeine you drink.General instructions        Keep a headache journal to help find out what may trigger your headaches. For example, write down:  What you eat and drink.How much sleep you get.Any change to your diet or medicines.Try massage or other relaxation techniques.Limit stress.Sit up straight, and do not tense your muscles.Do not use any products that contain nicotine or tobacco, such as cigarettes, e-cigarettes, and chewing tobacco. If you need help quitting, ask your health care provider.Exercise regularly as told by your health care provider.Sleep on a regular schedule. Get 7–9 hours of sleep each night, or the amount recommended by your health care provider.Keep all follow-up visits as told by your health care provider. This is important.Contact a health care provider if:  Your symptoms are not helped by medicine.You have a headache that is different from the usual headache.You have nausea or you vomit.You have a fever.Get help right away if:  Your headache becomes severe quickly.Your headache gets worse after moderate to intense physical activity.You have repeated vomiting.You have a stiff neck.You have a loss of vision.You have problems with speech.You have pain in the eye or ear.You have muscular weakness or loss of muscle control.You lose your balance or have trouble walking.You feel faint or pass out.You have confusion.You have a seizure.Summary  A headache is pain or discomfort felt around the head or neck area.There are many causes and types of headaches. In some cases, the cause may not be found.Keep a headache journal to help find out what may trigger your headaches. Watch your condition for any changes. Let your health care provider know about them.Contact a health care provider if you have a headache that is different from the usual headache, or if your symptoms are not helped by medicine.Get help right away if your headache becomes severe, you vomit, you have a loss of vision, you lose your balance, or you have a seizure.This information is not intended to replace advice given to you by your health care provider. Make sure you discuss any questions you have with your health care provider.    Migraine Headache  A migraine headache is an intense, throbbing pain on one side or both sides of the head. Migraines may also cause other symptoms, such as nausea, vomiting, and sensitivity to light and noise.  What are the causes?  Doing or taking certain things may also trigger migraines, such as:  Alcohol.Smoking.Medicines, such as:  Medicine used to treat chest pain (nitroglycerine).Birth control pills.Estrogen pills.Certain blood pressure medicines.Aged cheeses, chocolate, or caffeine.Foods or drinks that contain nitrates, glutamate, aspartame, or tyramine.Physical activity.Other things that may trigger a migraine include:  Menstruation.Pregnancy.Hunger.Stress, lack of sleep, too much sleep, or fatigue.Weather changes.What increases the risk?  The following factors may make you more likely to experience migraine headaches:  Age. Risk increases with age.Family history of migraine headaches.Being .Depression and anxiety.Obesity.Being a woman.Having a hole in the heart (patent foramen ovale) or other heart problems.What are the signs or symptoms?  The main symptom of this condition is pulsating or throbbing pain. Pain may:  Happen in any area of the head, such as on one side or both sides.Interfere with daily activities.Get worse with physical activity.Get worse with exposure to bright lights or loud noises.Other symptoms may include:  Nausea.Vomiting.Dizziness.General sensitivity to bright lights, loud noises, or smells.Before you get a migraine, you may get warning signs that a migraine is developing (aura). An aura may include:  Seeing flashing lights or having blind spots.Seeing bright spots, halos, or zigzag lines.Having tunnel vision or blurred vision.Having numbness or a tingling feeling.Having trouble talking.Having muscle weakness.How is this diagnosed?  A migraine headache can be diagnosed based on:  Your symptoms.A physical exam.Tests, such as CT scan or MRI of the head. These imaging tests can help rule out other causes of headaches.Taking fluid from the spine (lumbar puncture) and analyzing it (cerebrospinal fluid analysis, or CSF analysis).How is this treated?  A migraine headache is usually treated with medicines that:  Relieve pain.Relieve nausea.Prevent migraines from coming back.Treatment may also include:  Acupuncture.Lifestyle changes like avoiding foods that trigger migraines.Follow these instructions at home:  Medicines     Take over-the-counter and prescription medicines only as told by your health care provider.Do not drive or use heavy machinery while taking prescription pain medicine.To prevent or treat constipation while you are taking prescription pain medicine, your health care provider may recommend that you:  Drink enough fluid to keep your urine clear or pale yellow.Take over-the-counter or prescription medicines.Eat foods that are high in fiber, such as fresh fruits and vegetables, whole grains, and beans.Limit foods that are high in fat and processed sugars, such as fried and sweet foods.Lifestyle     Avoid alcohol use.Do not use any products that contain nicotine or tobacco, such as cigarettes and e-cigarettes. If you need help quitting, ask your health care provider.Get at least 8 hours of sleep every night.Limit your stress.General instructions               Keep a journal to find out what may trigger your migraine headaches. For example, write down:  What you eat and drink.How much sleep you get.Any change to your diet or medicines.If you have a migraine:  Avoid things that make your symptoms worse, such as bright lights.It may help to lie down in a dark, quiet room.Do not drive or use heavy machinery.Ask your health care provider what activities are safe for you while you are experiencing symptoms.Keep all follow-up visits as told by your health care provider. This is important.Contact a health care provider if:  You develop symptoms that are different or more severe than your usual migraine symptoms.Get help right away if:  Your migraine becomes severe.You have a fever.You have a stiff neck.You have vision loss.Your muscles feel weak or like you cannot control them.You start to lose your balance often.You develop trouble walking.You faint.This information is not intended to replace advice given to you by your health care provider. Make sure you discuss any questions you have with your health care provider.

## 2019-12-28 NOTE — ED PROVIDER NOTE - OBJECTIVE STATEMENT
34y F with history of migraines, asthma, degenerative disc disease, and depression taking 60mg Cymbalta presents to the ED with complains of worsening migraine 2x days. Patient notes see primary care doctor who prescribed Lyrica 200mg, metaxalone 2400mg, Norvasc 5mg, tramadol PRN and LP for rule out CSF leak 11/2/19. Patient notes over the past two days no relief with any medications and ran out of tramadol. Patient states having an MRI 11/22/19 which was negative. Patient endorses headache 8/10, nausea, sensitivity to light, and neck pain. Denies fever, chills, chest pain, shortness of breath, leg swelling, or vision change. 34y F with history of migraines, asthma, degenerative disc disease, and depression taking 60mg Cymbalta presents to the ED with complains of worsening migraine 2x days. Patient notes see primary care doctor who prescribed Lyrica 200mg, metaxalone 2400mg, Norvasc 5mg, tramadol PRN and LP for rule out CSF leak 11/2/19. Patient notes over the past two days no relief with any medications and ran out of tramadol. Patient states having an MRI 11/22/19 which was negative for similar sx. Patient endorses headache 8/10, nausea, sensitivity to light, and neck pain similar to typical migraine per pt. Denies fever, chills, chest pain, shortness of breath, leg swelling, or vision change.

## 2019-12-28 NOTE — ED PROVIDER NOTE - CLINICAL SUMMARY MEDICAL DECISION MAKING FREE TEXT BOX
General headache, states similar to prior migraine, no f/c, neck pain, meningismus, gradual in onset, no focal neuro deficits, will symptomatically treat and reassess.

## 2019-12-28 NOTE — ED ADULT NURSE REASSESSMENT NOTE - NS ED NURSE REASSESS COMMENT FT1
Headache and symptoms improved s/p meds, NSS bolus, vital signs stable, discharged to home in stable condition.

## 2020-01-03 ENCOUNTER — INBOUND DOCUMENT (OUTPATIENT)
Age: 35
End: 2020-01-03

## 2020-02-03 ENCOUNTER — APPOINTMENT (OUTPATIENT)
Dept: RHEUMATOLOGY | Facility: CLINIC | Age: 35
End: 2020-02-03

## 2020-03-24 ENCOUNTER — TRANSCRIPTION ENCOUNTER (OUTPATIENT)
Age: 35
End: 2020-03-24

## 2020-04-14 ENCOUNTER — TRANSCRIPTION ENCOUNTER (OUTPATIENT)
Age: 35
End: 2020-04-14

## 2020-04-25 ENCOUNTER — MESSAGE (OUTPATIENT)
Age: 35
End: 2020-04-25

## 2020-05-05 LAB
SARS-COV-2 IGG SERPL IA-ACNC: <0.1 INDEX
SARS-COV-2 IGG SERPL QL IA: NEGATIVE

## 2020-05-06 ENCOUNTER — TRANSCRIPTION ENCOUNTER (OUTPATIENT)
Age: 35
End: 2020-05-06

## 2020-07-16 NOTE — ED ADULT NURSE NOTE - PAIN RATING/NUMBER SCALE (0-10): ACTIVITY
[de-identified] : CT scan of the head was performed on 2/17/16. \par There were chronic changes related to his prior injury. \par There was no acute pathology.\par 
8

## 2021-03-10 ENCOUNTER — TRANSCRIPTION ENCOUNTER (OUTPATIENT)
Age: 36
End: 2021-03-10

## 2021-03-17 ENCOUNTER — EMERGENCY (EMERGENCY)
Facility: HOSPITAL | Age: 36
LOS: 1 days | Discharge: HOME | End: 2021-03-17
Admitting: EMERGENCY MEDICINE
Payer: COMMERCIAL

## 2021-03-17 VITALS
SYSTOLIC BLOOD PRESSURE: 125 MMHG | HEART RATE: 79 BPM | DIASTOLIC BLOOD PRESSURE: 87 MMHG | OXYGEN SATURATION: 99 % | HEIGHT: 64 IN | RESPIRATION RATE: 14 BRPM | TEMPERATURE: 97 F

## 2021-03-17 DIAGNOSIS — Z20.822 CONTACT WITH AND (SUSPECTED) EXPOSURE TO COVID-19: ICD-10-CM

## 2021-03-17 DIAGNOSIS — Z88.2 ALLERGY STATUS TO SULFONAMIDES: ICD-10-CM

## 2021-03-17 LAB — SARS-COV-2 RNA SPEC QL NAA+PROBE: SIGNIFICANT CHANGE UP

## 2021-03-17 PROCEDURE — U0005: CPT

## 2021-03-17 PROCEDURE — U0003: CPT

## 2021-03-17 PROCEDURE — 99283 EMERGENCY DEPT VISIT LOW MDM: CPT

## 2021-03-17 PROCEDURE — 99282 EMERGENCY DEPT VISIT SF MDM: CPT

## 2021-03-17 NOTE — ED PROVIDER NOTE - OBJECTIVE STATEMENT
Patient is presenting to the Emergency Department with a request to have COVID-19 testing.  Denies symptoms, including cough, shortness of breath, fever, chills, bodyaches or sore throat. yes

## 2021-03-17 NOTE — ED PROVIDER NOTE - PATIENT PORTAL LINK FT
You can access the FollowMyHealth Patient Portal offered by [facility] by registering at the following website: [email or web address]. By joining [employer]’s FollowMyHealth portal, you will also be able to view your health information using other applications (apps) compatible with our system.

## 2021-06-07 ENCOUNTER — APPOINTMENT (OUTPATIENT)
Dept: INTERNAL MEDICINE | Facility: CLINIC | Age: 36
End: 2021-06-07
Payer: COMMERCIAL

## 2021-06-07 VITALS
SYSTOLIC BLOOD PRESSURE: 110 MMHG | OXYGEN SATURATION: 97 % | BODY MASS INDEX: 27.66 KG/M2 | HEART RATE: 71 BPM | HEIGHT: 65 IN | DIASTOLIC BLOOD PRESSURE: 76 MMHG | WEIGHT: 166 LBS | TEMPERATURE: 97.1 F

## 2021-06-07 PROCEDURE — 99072 ADDL SUPL MATRL&STAF TM PHE: CPT

## 2021-06-07 PROCEDURE — 99385 PREV VISIT NEW AGE 18-39: CPT

## 2021-06-07 PROCEDURE — 36415 COLL VENOUS BLD VENIPUNCTURE: CPT

## 2021-06-07 NOTE — PHYSICAL EXAM
[No Acute Distress] : no acute distress [Well Nourished] : well nourished [Well Developed] : well developed [Well-Appearing] : well-appearing [Normal Sclera/Conjunctiva] : normal sclera/conjunctiva [PERRL] : pupils equal round and reactive to light [EOMI] : extraocular movements intact [Normal Outer Ear/Nose] : the outer ears and nose were normal in appearance [Normal Oropharynx] : the oropharynx was normal [No JVD] : no jugular venous distention [No Lymphadenopathy] : no lymphadenopathy [Supple] : supple [Thyroid Normal, No Nodules] : the thyroid was normal and there were no nodules present [No Respiratory Distress] : no respiratory distress  [No Accessory Muscle Use] : no accessory muscle use [Clear to Auscultation] : lungs were clear to auscultation bilaterally [Normal Rate] : normal rate  [Regular Rhythm] : with a regular rhythm [Normal S1, S2] : normal S1 and S2 [No Murmur] : no murmur heard [Pedal Pulses Present] : the pedal pulses are present [No Edema] : there was no peripheral edema [Soft] : abdomen soft [Non Tender] : non-tender [Non-distended] : non-distended [No Masses] : no abdominal mass palpated [No HSM] : no HSM [Normal Bowel Sounds] : normal bowel sounds [Normal Posterior Cervical Nodes] : no posterior cervical lymphadenopathy [Normal Anterior Cervical Nodes] : no anterior cervical lymphadenopathy [No Spinal Tenderness] : no spinal tenderness [No CVA Tenderness] : no CVA  tenderness [No Joint Swelling] : no joint swelling [Grossly Normal Strength/Tone] : grossly normal strength/tone [No Rash] : no rash [Coordination Grossly Intact] : coordination grossly intact [No Focal Deficits] : no focal deficits [Normal Gait] : normal gait [Normal Affect] : the affect was normal [Normal Insight/Judgement] : insight and judgment were intact

## 2021-06-07 NOTE — HISTORY OF PRESENT ILLNESS
[FreeTextEntry1] : Wellness [de-identified] : 34 yo f with hx of elev lipids and boderline A1C in the past.  \par Has gained 10 lbs over the past year.\par Gets lightheaded at times\par On Lyrica for headaches. - gets them daily.  This was after a head trauma.  Follows with neuro.  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    \par Exercises 3d/week. - feels well exercising.  Eating well- but this year not as good.    \par ON rixalti and cymbalta for anxiety and depression- follows with psychiatry.   Depression runs in the family. goes once a month.   Also a therapist.  \par saw gyn - all ok- referred to derm for hair loss- tsh was normal. .

## 2021-06-07 NOTE — PLAN
[FreeTextEntry1] : wellness complete\par labs today\par Anxiety and depression\par  - f/up psych\par  continue two drug regimen\par \par Headaches\par - f/up neuro\par  continue  lyrica

## 2021-06-08 LAB
ALBUMIN SERPL ELPH-MCNC: 4.9 G/DL
ALP BLD-CCNC: 89 U/L
ALT SERPL-CCNC: 10 U/L
ANION GAP SERPL CALC-SCNC: 12 MMOL/L
AST SERPL-CCNC: 14 U/L
BASOPHILS # BLD AUTO: 0.04 K/UL
BASOPHILS NFR BLD AUTO: 0.5 %
BILIRUB SERPL-MCNC: 0.3 MG/DL
BUN SERPL-MCNC: 13 MG/DL
CALCIUM SERPL-MCNC: 9.7 MG/DL
CHLORIDE SERPL-SCNC: 102 MMOL/L
CHOLEST SERPL-MCNC: 213 MG/DL
CO2 SERPL-SCNC: 26 MMOL/L
CREAT SERPL-MCNC: 0.77 MG/DL
EOSINOPHIL # BLD AUTO: 0.03 K/UL
EOSINOPHIL NFR BLD AUTO: 0.4 %
ESTIMATED AVERAGE GLUCOSE: 103 MG/DL
GLUCOSE SERPL-MCNC: 82 MG/DL
HBA1C MFR BLD HPLC: 5.2 %
HCT VFR BLD CALC: 41.8 %
HDLC SERPL-MCNC: 85 MG/DL
HGB BLD-MCNC: 13.2 G/DL
IMM GRANULOCYTES NFR BLD AUTO: 0.2 %
LDLC SERPL CALC-MCNC: 110 MG/DL
LYMPHOCYTES # BLD AUTO: 1.79 K/UL
LYMPHOCYTES NFR BLD AUTO: 21 %
MAN DIFF?: NORMAL
MCHC RBC-ENTMCNC: 30.5 PG
MCHC RBC-ENTMCNC: 31.6 GM/DL
MCV RBC AUTO: 96.5 FL
MONOCYTES # BLD AUTO: 0.28 K/UL
MONOCYTES NFR BLD AUTO: 3.3 %
NEUTROPHILS # BLD AUTO: 6.36 K/UL
NEUTROPHILS NFR BLD AUTO: 74.6 %
NONHDLC SERPL-MCNC: 128 MG/DL
PLATELET # BLD AUTO: 455 K/UL
POTASSIUM SERPL-SCNC: 4.6 MMOL/L
PROT SERPL-MCNC: 7.4 G/DL
RBC # BLD: 4.33 M/UL
RBC # FLD: 12.9 %
SODIUM SERPL-SCNC: 139 MMOL/L
TRIGL SERPL-MCNC: 89 MG/DL
WBC # FLD AUTO: 8.52 K/UL

## 2021-07-14 ENCOUNTER — EMERGENCY (EMERGENCY)
Facility: HOSPITAL | Age: 36
LOS: 1 days | Discharge: HOME | End: 2021-07-14
Attending: EMERGENCY MEDICINE | Admitting: EMERGENCY MEDICINE
Payer: COMMERCIAL

## 2021-07-14 VITALS
TEMPERATURE: 98 F | SYSTOLIC BLOOD PRESSURE: 139 MMHG | OXYGEN SATURATION: 99 % | HEIGHT: 64 IN | WEIGHT: 164.91 LBS | HEART RATE: 78 BPM | DIASTOLIC BLOOD PRESSURE: 93 MMHG | RESPIRATION RATE: 18 BRPM

## 2021-07-14 VITALS — OXYGEN SATURATION: 100 % | RESPIRATION RATE: 18 BRPM

## 2021-07-14 DIAGNOSIS — G43.909 MIGRAINE, UNSPECIFIED, NOT INTRACTABLE, WITHOUT STATUS MIGRAINOSUS: ICD-10-CM

## 2021-07-14 DIAGNOSIS — F32.9 MAJOR DEPRESSIVE DISORDER, SINGLE EPISODE, UNSPECIFIED: ICD-10-CM

## 2021-07-14 DIAGNOSIS — R55 SYNCOPE AND COLLAPSE: ICD-10-CM

## 2021-07-14 DIAGNOSIS — J45.909 UNSPECIFIED ASTHMA, UNCOMPLICATED: ICD-10-CM

## 2021-07-14 DIAGNOSIS — Z88.2 ALLERGY STATUS TO SULFONAMIDES: ICD-10-CM

## 2021-07-14 LAB
ALBUMIN SERPL ELPH-MCNC: 4.4 G/DL — SIGNIFICANT CHANGE UP (ref 3.3–5)
ALP SERPL-CCNC: 85 U/L — SIGNIFICANT CHANGE UP (ref 40–120)
ALT FLD-CCNC: 13 U/L — SIGNIFICANT CHANGE UP (ref 10–45)
ANION GAP SERPL CALC-SCNC: 11 MMOL/L — SIGNIFICANT CHANGE UP (ref 5–17)
AST SERPL-CCNC: 21 U/L — SIGNIFICANT CHANGE UP (ref 10–40)
BASOPHILS # BLD AUTO: 0.03 K/UL — SIGNIFICANT CHANGE UP (ref 0–0.2)
BASOPHILS NFR BLD AUTO: 0.4 % — SIGNIFICANT CHANGE UP (ref 0–2)
BILIRUB SERPL-MCNC: 0.4 MG/DL — SIGNIFICANT CHANGE UP (ref 0.2–1.2)
BUN SERPL-MCNC: 14 MG/DL — SIGNIFICANT CHANGE UP (ref 7–23)
CALCIUM SERPL-MCNC: 9.5 MG/DL — SIGNIFICANT CHANGE UP (ref 8.4–10.5)
CHLORIDE SERPL-SCNC: 103 MMOL/L — SIGNIFICANT CHANGE UP (ref 96–108)
CO2 SERPL-SCNC: 25 MMOL/L — SIGNIFICANT CHANGE UP (ref 22–31)
CREAT SERPL-MCNC: 0.72 MG/DL — SIGNIFICANT CHANGE UP (ref 0.5–1.3)
EOSINOPHIL # BLD AUTO: 0.04 K/UL — SIGNIFICANT CHANGE UP (ref 0–0.5)
EOSINOPHIL NFR BLD AUTO: 0.5 % — SIGNIFICANT CHANGE UP (ref 0–6)
GLUCOSE SERPL-MCNC: 93 MG/DL — SIGNIFICANT CHANGE UP (ref 70–99)
HCT VFR BLD CALC: 38.9 % — SIGNIFICANT CHANGE UP (ref 34.5–45)
HGB BLD-MCNC: 12.5 G/DL — SIGNIFICANT CHANGE UP (ref 11.5–15.5)
IMM GRANULOCYTES NFR BLD AUTO: 0.1 % — SIGNIFICANT CHANGE UP (ref 0–1.5)
LYMPHOCYTES # BLD AUTO: 2.08 K/UL — SIGNIFICANT CHANGE UP (ref 1–3.3)
LYMPHOCYTES # BLD AUTO: 25.6 % — SIGNIFICANT CHANGE UP (ref 13–44)
MCHC RBC-ENTMCNC: 30 PG — SIGNIFICANT CHANGE UP (ref 27–34)
MCHC RBC-ENTMCNC: 32.1 GM/DL — SIGNIFICANT CHANGE UP (ref 32–36)
MCV RBC AUTO: 93.3 FL — SIGNIFICANT CHANGE UP (ref 80–100)
MONOCYTES # BLD AUTO: 0.34 K/UL — SIGNIFICANT CHANGE UP (ref 0–0.9)
MONOCYTES NFR BLD AUTO: 4.2 % — SIGNIFICANT CHANGE UP (ref 2–14)
NEUTROPHILS # BLD AUTO: 5.63 K/UL — SIGNIFICANT CHANGE UP (ref 1.8–7.4)
NEUTROPHILS NFR BLD AUTO: 69.2 % — SIGNIFICANT CHANGE UP (ref 43–77)
NRBC # BLD: 0 /100 WBCS — SIGNIFICANT CHANGE UP (ref 0–0)
PLATELET # BLD AUTO: 466 K/UL — HIGH (ref 150–400)
POTASSIUM SERPL-MCNC: 4.1 MMOL/L — SIGNIFICANT CHANGE UP (ref 3.5–5.3)
POTASSIUM SERPL-SCNC: 4.1 MMOL/L — SIGNIFICANT CHANGE UP (ref 3.5–5.3)
PROT SERPL-MCNC: 7.6 G/DL — SIGNIFICANT CHANGE UP (ref 6–8.3)
RBC # BLD: 4.17 M/UL — SIGNIFICANT CHANGE UP (ref 3.8–5.2)
RBC # FLD: 12.2 % — SIGNIFICANT CHANGE UP (ref 10.3–14.5)
SODIUM SERPL-SCNC: 139 MMOL/L — SIGNIFICANT CHANGE UP (ref 135–145)
TROPONIN T SERPL-MCNC: 0.01 NG/ML — SIGNIFICANT CHANGE UP (ref 0–0.01)
WBC # BLD: 8.13 K/UL — SIGNIFICANT CHANGE UP (ref 3.8–10.5)
WBC # FLD AUTO: 8.13 K/UL — SIGNIFICANT CHANGE UP (ref 3.8–10.5)

## 2021-07-14 PROCEDURE — 36415 COLL VENOUS BLD VENIPUNCTURE: CPT

## 2021-07-14 PROCEDURE — 99283 EMERGENCY DEPT VISIT LOW MDM: CPT

## 2021-07-14 PROCEDURE — 82962 GLUCOSE BLOOD TEST: CPT

## 2021-07-14 PROCEDURE — 80053 COMPREHEN METABOLIC PANEL: CPT

## 2021-07-14 PROCEDURE — 84484 ASSAY OF TROPONIN QUANT: CPT

## 2021-07-14 PROCEDURE — 85025 COMPLETE CBC W/AUTO DIFF WBC: CPT

## 2021-07-14 PROCEDURE — 99285 EMERGENCY DEPT VISIT HI MDM: CPT

## 2021-07-14 RX ORDER — SODIUM CHLORIDE 9 MG/ML
1000 INJECTION INTRAMUSCULAR; INTRAVENOUS; SUBCUTANEOUS ONCE
Refills: 0 | Status: COMPLETED | OUTPATIENT
Start: 2021-07-14 | End: 2021-07-14

## 2021-07-14 RX ADMIN — SODIUM CHLORIDE 1000 MILLILITER(S): 9 INJECTION INTRAMUSCULAR; INTRAVENOUS; SUBCUTANEOUS at 17:01

## 2021-07-14 NOTE — ED PROVIDER NOTE - PATIENT PORTAL LINK FT
You can access the FollowMyHealth Patient Portal offered by [facility] by registering at the following website: [email or web address]. By joining [employer]’s FollowMyHealth portal, you will also be able to view your health information using other applications (apps) compatible with our system.

## 2021-07-14 NOTE — ED PROVIDER NOTE - ATTENDING CONTRIBUTION TO CARE
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

## 2021-07-14 NOTE — ED PROVIDER NOTE - CLINICAL SUMMARY MEDICAL DECISION MAKING FREE TEXT BOX
36 yo female with h/o migraine HA, h/o head injury, depression, vertigo, chronic headaches x 2 years, present to ER c/o near syncope episode that she had today at work. Pt states she felt hot, lightheaded, dizzy, felt like her legs became weak and vision blurry and she almost fainted. Pt quickly sat down to prevent LOC. pt also admits that she is menstruating today and her flow is usually heavy. pt had no CP, SOB, nausea, vomiting or sweats. Symptoms resolved shortly but she was sent to Er for evaluation. pt well appearing, not orthostatic, has normal ECG-NSR, no ischemic changes, has normal labs and non focal Neuro Exam: Alert and oriented x 3, face symmetric and speech fluent. Strength 5/5 x 4 ext and symmetric, nml gross motor movement, nml gait. No focal deficits noted.; suspect vasovagal episode.  results discussed with pt. she is seeking discharge home. Pt has neurologist and will f/u with her doctor. d/c home comfortable.

## 2021-07-14 NOTE — ED ADULT NURSE NOTE - OBJECTIVE STATEMENT
Pt is a 34 y/o female A&Ox4 in NAD ambulatory with steady gait c/o near syncopal episode. Pt is an L&D RN, was working at the time. Pt denies LOC, fall, head trauma, N/V/D, dizziness, chest pain. Pt currently on menstrual period, endorses "today was heavier than usual." , EKG done and signed. Pt talking in clear, full sentences, respirations even and unlabored. Pt is a 34 y/o female A&Ox4 in NAD ambulatory with steady gait c/o near syncopal episode. Pt is an L&D RN, was working at the time. Pt denies LOC, fall, head trauma, N/V/D, dizziness, chest pain. Pt currently menstruating, endorses "today was heavier than usual." , EKG done and signed. Pt talking in clear, full sentences, respirations even and unlabored.

## 2021-07-14 NOTE — ED PROVIDER NOTE - MUSCULOSKELETAL, MLM
Pt said he is totally out of the medication. Asking for refill asap   Spine and all extremities grossly appears normal, range of motion is not limited, no muscle or joint tenderness

## 2021-07-14 NOTE — ED PROVIDER NOTE - NSFOLLOWUPINSTRUCTIONS_ED_ALL_ED_FT
Near-Syncope    Near-syncope is when you suddenly get weak or dizzy, or you feel like you might pass out (faint). This may also be called presyncope. This is due to a lack of blood flow to the brain. During an episode of near-syncope, you may:  •Feel dizzy, weak, or light-headed.      •Feel sick to your stomach (nauseous).      •See all white or all black.      •See spots.      •Have cold, clammy skin.      This condition is caused by a sudden decrease in blood flow to the brain. This decrease can result from various causes, but most of those causes are not dangerous. However, near-syncope may be a sign of a serious medical problem, so it is important to seek medical care.      Follow these instructions at home:    Medicines     •Take over-the-counter and prescription medicines only as told by your doctor.      •If you are taking blood pressure or heart medicine, get up slowly and spend many minutes getting ready to sit and then stand. This can help with dizziness.      General instructions     •Be aware of any changes in your symptoms.      •Talk with your doctor about your symptoms. You may need to have testing to find the cause of your near-syncope.      •If you start to feel like you might pass out, lie down right away. Raise (elevate) your feet above the level of your heart. Breathe deeply and steadily. Wait until all of the symptoms are gone.      •Have someone stay with you until you feel stable.      • Do not drive, use machinery, or play sports until your doctor says it is okay.      •Drink enough fluid to keep your pee (urine) pale yellow.      •Keep all follow-up visits as told by your doctor. This is important.        Get help right away if you:    •Have a seizure.    •Have pain in your:  •Chest.      •Belly (abdomen).      •Back.        •Faint once or more than once.      •Have a very bad headache.      •Are bleeding from your mouth or butt.      •Have black or tarry poop (stool).      •Have a very fast or uneven heartbeat (palpitations).      •Are mixed up (confused).      •Have trouble walking.      •Are very weak.      •Have trouble seeing.      These symptoms may be an emergency. Do not wait to see if the symptoms will go away. Get medical help right away. Call your local emergency services (911 in the [place] Do not drive yourself to the hospital.       Summary    •Near-syncope is when you suddenly get weak or dizzy, or you feel like you might pass out (faint).      •This condition is caused by a lack of blood flow to the brain.      •Near-syncope may be a sign of a serious medical problem, so it is important to seek medical care.      This information is not intended to replace advice given to you by your health care provider. Make sure you discuss any questions you have with your health care provider.

## 2021-07-14 NOTE — ED PROVIDER NOTE - OBJECTIVE STATEMENT
Patient is a 36 y/o female with PMHx of chronic migraines, depression, asthma and vertigo presenting with a syncopal episode that occurred 1 hour ago at work. 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 Pt states that she was diagnosed with vertigo 3 weeks ago by her PCP and the symptoms(dizziness, room spinning sensation) have progressively gotten worse and more frequent, occurring multiple times a day.  Pt recalls eating an hour prior to the episode and has stayed hydrated. Pt reports having a similar syncopal episode a few months ago with no known cause. Patient is a 36 y/o female with PMHx of chronic headaches, migraines, depression, asthma and vertigo presenting with a syncopal episode that occurred 2 hours ago at work. 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 Pt states that she was diagnosed with vertigo 3 weeks ago by her PCP and the symptoms(dizziness, room spinning sensation) have progressively gotten worse and more frequent, occurring multiple times a day and does not take medication for it. Pt recalls eating an hour prior to the episode and adequate hydration. Pt states she is currently on day 2 of her menstrual period and experienced heavy bleeding this morning with several saturated pads, which is consistent for her cycle. She reports a single head trauma incident in 2019 with normal CT but resulted in chronic, daily headaches. Pt reports having a similar syncopal episode a few months ago with no known cause. Denies fever/chills, SOB, chest pain, abd pain, LOC, tinnitus or recent head trauma,

## 2021-10-27 ENCOUNTER — APPOINTMENT (OUTPATIENT)
Dept: INTERNAL MEDICINE | Facility: CLINIC | Age: 36
End: 2021-10-27
Payer: COMMERCIAL

## 2021-10-27 DIAGNOSIS — L03.019 CELLULITIS OF UNSPECIFIED FINGER: ICD-10-CM

## 2021-10-27 PROCEDURE — 99213 OFFICE O/P EST LOW 20 MIN: CPT | Mod: 95

## 2021-10-27 NOTE — PHYSICAL EXAM
[Normal] : no acute distress, well nourished, well developed and well-appearing [de-identified] : erythema and sweling with white pustular discharge

## 2021-10-27 NOTE — HISTORY OF PRESENT ILLNESS
[Home] : at home, [unfilled] , at the time of the visit. [Medical Office: ([facility])___] : at the medical office located in  [Verbal consent obtained from patient] : the patient, [unfilled] [de-identified] : develped swelliing after puling a hangnail  and d/c from her thumb\par no fever or chills.\par

## 2021-12-02 ENCOUNTER — NON-APPOINTMENT (OUTPATIENT)
Age: 36
End: 2021-12-02

## 2021-12-02 ENCOUNTER — APPOINTMENT (OUTPATIENT)
Dept: UROLOGY | Facility: CLINIC | Age: 36
End: 2021-12-02
Payer: COMMERCIAL

## 2021-12-02 VITALS — SYSTOLIC BLOOD PRESSURE: 122 MMHG | DIASTOLIC BLOOD PRESSURE: 74 MMHG | TEMPERATURE: 97.8 F | HEART RATE: 87 BPM

## 2021-12-02 DIAGNOSIS — R35.0 FREQUENCY OF MICTURITION: ICD-10-CM

## 2021-12-02 DIAGNOSIS — N39.41 URGE INCONTINENCE: ICD-10-CM

## 2021-12-02 LAB
BILIRUB UR QL STRIP: NORMAL
CLARITY UR: CLEAR
COLLECTION METHOD: NORMAL
GLUCOSE UR-MCNC: NORMAL
HCG UR QL: 0.2 EU/DL
HGB UR QL STRIP.AUTO: NORMAL
KETONES UR-MCNC: NORMAL
LEUKOCYTE ESTERASE UR QL STRIP: NORMAL
NITRITE UR QL STRIP: NORMAL
PH UR STRIP: 7
PROT UR STRIP-MCNC: NORMAL
SP GR UR STRIP: 1.02

## 2021-12-02 PROCEDURE — 51798 US URINE CAPACITY MEASURE: CPT

## 2021-12-02 PROCEDURE — 99204 OFFICE O/P NEW MOD 45 MIN: CPT

## 2021-12-02 PROCEDURE — 81003 URINALYSIS AUTO W/O SCOPE: CPT | Mod: QW

## 2021-12-02 NOTE — ASSESSMENT
[FreeTextEntry1] : 37 yo female with urinary symptoms consistent with OAB.  We discussed treatment options including, life style modification(reducing caffeine and alcohol intake) as well as medical therapy (anticholinergic and beta-3 agonists), intravesical botox and neuromodulation.  We discussed potential side effects of anticholinergic and beta-3 agonist medications.  She would like to try life style modification as well as a month trial of Myrbetriq 50 mg daily.  She will return in 1 month for follow up.  \par \par Plan:\par 1. Decrease caffeine and alcohol intake\par 2. Myrbetriq 50 mg daily\par 3. RTC in 1 month

## 2021-12-02 NOTE — HISTORY OF PRESENT ILLNESS
[FreeTextEntry1] : 35 yo female with several month history of urinary urgency, frequency, and episodes of urge incontinence.  She will typically leak at least once a day.  She typically goes through one pad per day.  She denies dysuria, hematuria, or nocturia.  SHe also notes occasional GUNNAR although this is typically when she has been holding in her urine for long periods of time.  She notes worse urge symptoms when she drinks coffee.  She notes that she had similar but less severe symptoms in 2018 at which time she underwent urodynamics which, per the patient, did not show evidence of OAB, and she was started on a course of VESIcare which did not help.  \par \par Urine dip (-)\par PVR = 0 cc

## 2021-12-19 ENCOUNTER — TRANSCRIPTION ENCOUNTER (OUTPATIENT)
Age: 36
End: 2021-12-19

## 2021-12-24 ENCOUNTER — TRANSCRIPTION ENCOUNTER (OUTPATIENT)
Age: 36
End: 2021-12-24

## 2022-01-09 ENCOUNTER — RESULT REVIEW (OUTPATIENT)
Age: 37
End: 2022-01-09

## 2022-02-09 ENCOUNTER — TRANSCRIPTION ENCOUNTER (OUTPATIENT)
Age: 37
End: 2022-02-09

## 2022-04-27 ENCOUNTER — NON-APPOINTMENT (OUTPATIENT)
Age: 37
End: 2022-04-27

## 2022-04-28 ENCOUNTER — APPOINTMENT (OUTPATIENT)
Dept: PSYCHIATRY | Facility: CLINIC | Age: 37
End: 2022-04-28
Payer: COMMERCIAL

## 2022-04-28 PROCEDURE — 99205 OFFICE O/P NEW HI 60 MIN: CPT

## 2022-04-28 RX ORDER — BREXPIPRAZOLE 1 MG/1
1 TABLET ORAL DAILY
Qty: 1 | Refills: 0 | Status: DISCONTINUED | COMMUNITY
Start: 2021-06-07 | End: 2022-04-28

## 2022-05-16 ENCOUNTER — APPOINTMENT (OUTPATIENT)
Dept: PSYCHIATRY | Facility: CLINIC | Age: 37
End: 2022-05-16

## 2022-05-20 ENCOUNTER — APPOINTMENT (OUTPATIENT)
Dept: INTERNAL MEDICINE | Facility: CLINIC | Age: 37
End: 2022-05-20
Payer: COMMERCIAL

## 2022-05-20 VITALS
HEIGHT: 64 IN | SYSTOLIC BLOOD PRESSURE: 122 MMHG | OXYGEN SATURATION: 98 % | WEIGHT: 175 LBS | TEMPERATURE: 97.6 F | DIASTOLIC BLOOD PRESSURE: 76 MMHG | HEART RATE: 68 BPM | BODY MASS INDEX: 29.88 KG/M2

## 2022-05-20 PROCEDURE — 99395 PREV VISIT EST AGE 18-39: CPT

## 2022-05-20 PROCEDURE — 36415 COLL VENOUS BLD VENIPUNCTURE: CPT

## 2022-05-20 RX ORDER — MIRABEGRON 50 MG/1
50 TABLET, FILM COATED, EXTENDED RELEASE ORAL
Qty: 30 | Refills: 1 | Status: DISCONTINUED | COMMUNITY
Start: 2021-12-02 | End: 2022-05-20

## 2022-05-20 RX ORDER — CEPHALEXIN 500 MG/1
500 TABLET ORAL 3 TIMES DAILY
Qty: 15 | Refills: 0 | Status: DISCONTINUED | COMMUNITY
Start: 2021-10-27 | End: 2022-05-20

## 2022-05-20 NOTE — REVIEW OF SYSTEMS
[Fatigue] : fatigue [Headache] : headache [Anxiety] : anxiety [Depression] : depression [Negative] : Heme/Lymph

## 2022-05-21 LAB
ALBUMIN SERPL ELPH-MCNC: 4.6 G/DL
ALP BLD-CCNC: 94 U/L
ALT SERPL-CCNC: 18 U/L
ANION GAP SERPL CALC-SCNC: 11 MMOL/L
AST SERPL-CCNC: 21 U/L
BASOPHILS # BLD AUTO: 0.02 K/UL
BASOPHILS NFR BLD AUTO: 0.2 %
BILIRUB SERPL-MCNC: 0.4 MG/DL
BUN SERPL-MCNC: 13 MG/DL
CALCIUM SERPL-MCNC: 9.7 MG/DL
CHLORIDE SERPL-SCNC: 105 MMOL/L
CHOLEST SERPL-MCNC: 205 MG/DL
CO2 SERPL-SCNC: 25 MMOL/L
CREAT SERPL-MCNC: 0.69 MG/DL
EGFR: 115 ML/MIN/1.73M2
EOSINOPHIL # BLD AUTO: 0.04 K/UL
EOSINOPHIL NFR BLD AUTO: 0.5 %
ESTIMATED AVERAGE GLUCOSE: 108 MG/DL
FERRITIN SERPL-MCNC: 25 NG/ML
GLUCOSE SERPL-MCNC: 88 MG/DL
HBA1C MFR BLD HPLC: 5.4 %
HCT VFR BLD CALC: 41.8 %
HDLC SERPL-MCNC: 63 MG/DL
HGB BLD-MCNC: 13.1 G/DL
IMM GRANULOCYTES NFR BLD AUTO: 0.2 %
IRON SATN MFR SERPL: 44 %
IRON SERPL-MCNC: 172 UG/DL
LDLC SERPL CALC-MCNC: 116 MG/DL
LYMPHOCYTES # BLD AUTO: 1.87 K/UL
LYMPHOCYTES NFR BLD AUTO: 23 %
MAN DIFF?: NORMAL
MCHC RBC-ENTMCNC: 30 PG
MCHC RBC-ENTMCNC: 31.3 GM/DL
MCV RBC AUTO: 95.7 FL
MONOCYTES # BLD AUTO: 0.33 K/UL
MONOCYTES NFR BLD AUTO: 4.1 %
NEUTROPHILS # BLD AUTO: 5.86 K/UL
NEUTROPHILS NFR BLD AUTO: 72 %
NONHDLC SERPL-MCNC: 142 MG/DL
PLATELET # BLD AUTO: 494 K/UL
POTASSIUM SERPL-SCNC: 4.4 MMOL/L
PROT SERPL-MCNC: 7.3 G/DL
RBC # BLD: 4.37 M/UL
RBC # FLD: 13.2 %
SODIUM SERPL-SCNC: 141 MMOL/L
TIBC SERPL-MCNC: 394 UG/DL
TRIGL SERPL-MCNC: 130 MG/DL
TSH SERPL-ACNC: 1.4 UIU/ML
UIBC SERPL-MCNC: 222 UG/DL
WBC # FLD AUTO: 8.14 K/UL

## 2022-05-21 NOTE — PHYSICAL EXAM
[Normal] : no acute distress, well nourished, well developed and well-appearing [de-identified] : erythema and sweling with white pustular discharge

## 2022-05-21 NOTE — PLAN
[FreeTextEntry1] : wellness complete\par labs today\par binge eating -will follow up wth therapsiit\par Fatigue - check labs today\par HSV2 - reasuurance given\par SHANNAN eval -info given for Dr Knight\par Migraines - f/up neurology

## 2022-05-21 NOTE — HISTORY OF PRESENT ILLNESS
[FreeTextEntry1] : Wellness [de-identified] : 37 yo f here for wellness\par migraines worse\par  covid in December - migraines after that - weekly then daily-  was out for a week - seeing her neurologist\par on pregabalin 200 only now.\par last year on a eating disorder - was on vyvanse - binge eating disorder - following with psych - then changed to cymbalta and then abck to lexapro.  \par ztill with depressive episodes.  \par menstruation is regular.  \par now on wellbutrin as well.- but has developed brain fog.  making mistakes at work. 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 saw derm - told it will go away 1-2 years.  - going to fllow up again.  \par recurrent bv - tok flagyl twice..  \par snoring - has woken herself up.  \par

## 2022-05-26 ENCOUNTER — APPOINTMENT (OUTPATIENT)
Dept: PSYCHIATRY | Facility: CLINIC | Age: 37
End: 2022-05-26
Payer: COMMERCIAL

## 2022-05-26 DIAGNOSIS — G89.29 HEADACHE, UNSPECIFIED: ICD-10-CM

## 2022-05-26 DIAGNOSIS — R51.9 HEADACHE, UNSPECIFIED: ICD-10-CM

## 2022-05-26 PROCEDURE — 99214 OFFICE O/P EST MOD 30 MIN: CPT | Mod: 95

## 2022-05-26 RX ORDER — BUPROPION HYDROCHLORIDE 150 MG/1
150 TABLET, EXTENDED RELEASE ORAL
Qty: 30 | Refills: 0 | Status: DISCONTINUED | COMMUNITY
Start: 2022-04-28 | End: 2022-05-26

## 2022-06-19 ENCOUNTER — NON-APPOINTMENT (OUTPATIENT)
Age: 37
End: 2022-06-19

## 2022-07-05 RX ORDER — ALBUTEROL SULFATE 90 UG/1
108 (90 BASE) INHALANT RESPIRATORY (INHALATION) EVERY 4 HOURS
Qty: 1 | Refills: 11 | Status: ACTIVE | COMMUNITY
Start: 2021-12-22 | End: 1900-01-01

## 2022-08-24 ENCOUNTER — APPOINTMENT (OUTPATIENT)
Dept: PULMONOLOGY | Facility: CLINIC | Age: 37
End: 2022-08-24

## 2022-08-24 VITALS
BODY MASS INDEX: 30.22 KG/M2 | SYSTOLIC BLOOD PRESSURE: 120 MMHG | TEMPERATURE: 97.9 F | DIASTOLIC BLOOD PRESSURE: 83 MMHG | HEART RATE: 72 BPM | WEIGHT: 177 LBS | HEIGHT: 64 IN | OXYGEN SATURATION: 97 % | RESPIRATION RATE: 12 BRPM

## 2022-08-24 DIAGNOSIS — G47.19 OTHER HYPERSOMNIA: ICD-10-CM

## 2022-08-24 PROCEDURE — 99204 OFFICE O/P NEW MOD 45 MIN: CPT

## 2022-08-24 NOTE — REVIEW OF SYSTEMS
[EDS: ESS=____] : daytime somnolence: ESS=[unfilled] [Fatigue] : fatigue [Recent Wt Gain (___ Lbs)] : recent [unfilled] ~Ulb weight gain [Snoring] : snoring [Obesity] : obesity [Depression] : depression [Negative] : Musculoskeletal

## 2022-08-24 NOTE — PHYSICAL EXAM
[General Appearance - In No Acute Distress] : no acute distress [Normal Conjunctiva] : the conjunctiva exhibited no abnormalities [Normal Oropharynx] : normal oropharynx [Neck Appearance] : the appearance of the neck was normal [Neck Cervical Mass (___cm)] : no neck mass was observed [Apical Impulse] : the apical impulse was normal [Heart Sounds] : normal S1 and S2 [] : no respiratory distress [Exaggerated Use Of Accessory Muscles For Inspiration] : no accessory muscle use [Abnormal Walk] : normal gait [Involuntary Movements] : no involuntary movements were seen [No Focal Deficits] : no focal deficits [Oriented To Time, Place, And Person] : oriented to person, place, and time [Affect] : the affect was normal [Mood] : the mood was normal

## 2022-08-25 ENCOUNTER — APPOINTMENT (OUTPATIENT)
Dept: PSYCHIATRY | Facility: CLINIC | Age: 37
End: 2022-08-25

## 2022-08-25 PROCEDURE — 99214 OFFICE O/P EST MOD 30 MIN: CPT | Mod: 95

## 2022-08-25 NOTE — HISTORY OF PRESENT ILLNESS
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 \par \par She was referred by her PCP. She reports snoring, but denies witnessed apnea.  Has  gained 20lb since 2020. She wakes up with morning HA and dry mouth and dozes off easily during the day.  \par \par Father with possible SHANNAN but never was treated. \par \par \par Sleep Routine:\par \par She goes to bed at 11, sleep latency is about 30 , she wakes up 2x/night, WASO is about 10 min, and then she is up at 5 A on work days and 10 a on off days. She naps everyday 3 hours. Sleepy, with Friant sleepiness scale (out of 24 points) =  13\par \par She denies cataplexy, RLS, parasomnia, or any other sleep behavioral issues.

## 2022-08-25 NOTE — ASSESSMENT
[FreeTextEntry1] : 37 y/o F with c/o snoring and excessive day time sleepiness who is here for initial evaluation of possible sleep apnea. Sleep  complicated by work (12h shifts), hx depression and treatment for depression, but sleep disordered breathing appears likely.  Discussed sleep hygiene, constant wake time might improve sleep quality.   Overnight overnight polysomnography ordered.

## 2022-10-03 ENCOUNTER — NON-APPOINTMENT (OUTPATIENT)
Age: 37
End: 2022-10-03

## 2023-01-18 NOTE — ED PROVIDER NOTE - CHIEF COMPLAINT
The patient is a 33y Female complaining of headache. Cosentyx Pregnancy And Lactation Text: This medication is Pregnancy Category B and is considered safe during pregnancy. It is unknown if this medication is excreted in breast milk.

## 2023-05-03 ENCOUNTER — NON-APPOINTMENT (OUTPATIENT)
Age: 38
End: 2023-05-03

## 2023-05-08 ENCOUNTER — TRANSCRIPTION ENCOUNTER (OUTPATIENT)
Age: 38
End: 2023-05-08

## 2023-05-09 ENCOUNTER — APPOINTMENT (OUTPATIENT)
Age: 38
End: 2023-05-09
Payer: COMMERCIAL

## 2023-05-09 VITALS
HEIGHT: 64 IN | HEART RATE: 96 BPM | SYSTOLIC BLOOD PRESSURE: 127 MMHG | RESPIRATION RATE: 12 BRPM | BODY MASS INDEX: 33.12 KG/M2 | TEMPERATURE: 98.1 F | WEIGHT: 194 LBS | OXYGEN SATURATION: 99 % | DIASTOLIC BLOOD PRESSURE: 83 MMHG

## 2023-05-09 DIAGNOSIS — Z00.00 ENCOUNTER FOR GENERAL ADULT MEDICAL EXAMINATION W/OUT ABNORMAL FINDINGS: ICD-10-CM

## 2023-05-09 DIAGNOSIS — R06.83 SNORING: ICD-10-CM

## 2023-05-09 PROCEDURE — 36415 COLL VENOUS BLD VENIPUNCTURE: CPT

## 2023-05-09 PROCEDURE — 99395 PREV VISIT EST AGE 18-39: CPT

## 2023-05-09 NOTE — PLAN
[FreeTextEntry1] : wellness complete\par labs today\par binge eating -will follow up wth therapsiit\par Depression - will see psych  - feels safe denies SI/HI\par HSV2 - reasuurance given\par SHANNAN eval -iset up home study\par Migraines - f/up neurology referral given

## 2023-05-09 NOTE — PHYSICAL EXAM
[No Edema] : there was no peripheral edema [Normal] : affect was normal and insight and judgment were intact [de-identified] : erythema and sweling with white pustular discharge

## 2023-05-09 NOTE — HISTORY OF PRESENT ILLNESS
[FreeTextEntry1] : Wellness [de-identified] : 38 yo f here for wellness\par migraines, depression, binge eating disorder\par in a womens health group. but eats at night.  taking lexapro  but feels sad.  Seeing psych NP \par seeing her therapist regularly\par seeing gyn - having hsv 2 outbreaks a lot  \par going to see a new psychiatrist\par working in Labor and delivery\par \par sees gyn

## 2023-05-10 LAB
ALBUMIN SERPL ELPH-MCNC: 4.5 G/DL
ALP BLD-CCNC: 94 U/L
ALT SERPL-CCNC: 38 U/L
ANION GAP SERPL CALC-SCNC: 13 MMOL/L
APPEARANCE: CLEAR
AST SERPL-CCNC: 26 U/L
BASOPHILS # BLD AUTO: 0.03 K/UL
BASOPHILS NFR BLD AUTO: 0.4 %
BILIRUB SERPL-MCNC: 0.3 MG/DL
BILIRUBIN URINE: NEGATIVE
BLOOD URINE: ABNORMAL
BUN SERPL-MCNC: 13 MG/DL
CALCIUM SERPL-MCNC: 9.8 MG/DL
CHLORIDE SERPL-SCNC: 102 MMOL/L
CHOLEST SERPL-MCNC: 247 MG/DL
CO2 SERPL-SCNC: 24 MMOL/L
COLOR: ABNORMAL
CREAT SERPL-MCNC: 0.69 MG/DL
EGFR: 115 ML/MIN/1.73M2
EOSINOPHIL # BLD AUTO: 0.09 K/UL
EOSINOPHIL NFR BLD AUTO: 1.1 %
ESTIMATED AVERAGE GLUCOSE: 114 MG/DL
GLUCOSE QUALITATIVE U: NEGATIVE MG/DL
GLUCOSE SERPL-MCNC: 88 MG/DL
HBA1C MFR BLD HPLC: 5.6 %
HCT VFR BLD CALC: 40.9 %
HDLC SERPL-MCNC: 71 MG/DL
HGB BLD-MCNC: 12.8 G/DL
IMM GRANULOCYTES NFR BLD AUTO: 0.1 %
KETONES URINE: NEGATIVE MG/DL
LDLC SERPL CALC-MCNC: 152 MG/DL
LEUKOCYTE ESTERASE URINE: NEGATIVE
LYMPHOCYTES # BLD AUTO: 2.29 K/UL
LYMPHOCYTES NFR BLD AUTO: 28.7 %
MAN DIFF?: NORMAL
MCHC RBC-ENTMCNC: 29 PG
MCHC RBC-ENTMCNC: 31.3 GM/DL
MCV RBC AUTO: 92.7 FL
MONOCYTES # BLD AUTO: 0.41 K/UL
MONOCYTES NFR BLD AUTO: 5.1 %
NEUTROPHILS # BLD AUTO: 5.14 K/UL
NEUTROPHILS NFR BLD AUTO: 64.6 %
NITRITE URINE: NEGATIVE
NONHDLC SERPL-MCNC: 176 MG/DL
PH URINE: 7
PLATELET # BLD AUTO: 496 K/UL
POTASSIUM SERPL-SCNC: 4.5 MMOL/L
PROT SERPL-MCNC: 7.3 G/DL
PROTEIN URINE: 30 MG/DL
RBC # BLD: 4.41 M/UL
RBC # FLD: 12.9 %
SODIUM SERPL-SCNC: 138 MMOL/L
SPECIFIC GRAVITY URINE: 1.01
T3FREE SERPL-MCNC: 2.85 PG/ML
T4 FREE SERPL-MCNC: 0.9 NG/DL
TRIGL SERPL-MCNC: 122 MG/DL
TSH SERPL-ACNC: 2.14 UIU/ML
UROBILINOGEN URINE: 0.2 MG/DL
VIT B12 SERPL-MCNC: 784 PG/ML
WBC # FLD AUTO: 7.97 K/UL

## 2023-05-11 LAB — THYROPEROXIDASE AB SERPL IA-ACNC: 13.9 IU/ML

## 2023-06-05 ENCOUNTER — APPOINTMENT (OUTPATIENT)
Dept: HUMAN REPRODUCTION | Facility: CLINIC | Age: 38
End: 2023-06-05
Payer: COMMERCIAL

## 2023-06-05 PROCEDURE — 76830 TRANSVAGINAL US NON-OB: CPT

## 2023-06-05 PROCEDURE — 36415 COLL VENOUS BLD VENIPUNCTURE: CPT

## 2023-06-05 PROCEDURE — 99204 OFFICE O/P NEW MOD 45 MIN: CPT | Mod: 25

## 2023-06-21 ENCOUNTER — APPOINTMENT (OUTPATIENT)
Dept: HUMAN REPRODUCTION | Facility: CLINIC | Age: 38
End: 2023-06-21
Payer: COMMERCIAL

## 2023-06-21 PROCEDURE — 99214 OFFICE O/P EST MOD 30 MIN: CPT | Mod: 95

## 2023-06-22 ENCOUNTER — TRANSCRIPTION ENCOUNTER (OUTPATIENT)
Age: 38
End: 2023-06-22

## 2023-06-27 ENCOUNTER — TRANSCRIPTION ENCOUNTER (OUTPATIENT)
Age: 38
End: 2023-06-27

## 2023-06-27 RX ORDER — BUPROPION HYDROCHLORIDE 75 MG/1
75 TABLET, FILM COATED ORAL
Qty: 30 | Refills: 2 | Status: DISCONTINUED | COMMUNITY
Start: 2022-04-28 | End: 2023-06-27

## 2023-07-07 ENCOUNTER — APPOINTMENT (OUTPATIENT)
Dept: INTERNAL MEDICINE | Facility: CLINIC | Age: 38
End: 2023-07-07
Payer: COMMERCIAL

## 2023-07-07 ENCOUNTER — APPOINTMENT (OUTPATIENT)
Dept: INTERNAL MEDICINE | Facility: CLINIC | Age: 38
End: 2023-07-07

## 2023-07-07 PROCEDURE — 97802 MEDICAL NUTRITION INDIV IN: CPT | Mod: 95

## 2023-07-11 ENCOUNTER — APPOINTMENT (OUTPATIENT)
Dept: INTERNAL MEDICINE | Facility: CLINIC | Age: 38
End: 2023-07-11
Payer: COMMERCIAL

## 2023-07-11 ENCOUNTER — APPOINTMENT (OUTPATIENT)
Dept: PSYCHIATRY | Facility: CLINIC | Age: 38
End: 2023-07-11

## 2023-07-11 DIAGNOSIS — R39.15 URGENCY OF URINATION: ICD-10-CM

## 2023-07-11 PROCEDURE — 99213 OFFICE O/P EST LOW 20 MIN: CPT | Mod: 95

## 2023-07-11 RX ORDER — NITROFURANTOIN (MONOHYDRATE/MACROCRYSTALS) 25; 75 MG/1; MG/1
100 CAPSULE ORAL
Qty: 14 | Refills: 0 | Status: ACTIVE | COMMUNITY
Start: 2023-07-11 | End: 1900-01-01

## 2023-07-11 NOTE — PLAN
[FreeTextEntry1] : Depression\par \par Plan to change to sertraline - taper lexapro to 10mg and start 50mg sertraline\par still to follow up with psychiatrist\par Panic attacks- for prn use of alprazolam -reviewe din detail.\par possible uti  check ua cx and start macrobid after

## 2023-07-11 NOTE — HISTORY OF PRESENT ILLNESS
[Home] : at home, [unfilled] , at the time of the visit. [Medical Office: ([facility])___] : at the medical office located in  [Verbal consent obtained from patient] : the patient, [unfilled] [de-identified] : 36 yo f with hx of depression, binge eating disorder\par -panic attacks for the past month- 2-3 times a day.  \par - issues getting out of bed., can sleep for 19 hours a day.  \par - Does not have a psychaitrist yet.   Has tried to reach a psychotherapist.  \par - currently has an appt on 7/27. \par - remains on lexapro 20mg\par - denies SI HI. \par on medical leave - FMLA intermittent - \par \par concern for UTI -

## 2023-07-13 LAB
APPEARANCE: CLEAR
BILIRUBIN URINE: NEGATIVE
BLOOD URINE: NEGATIVE
COLOR: YELLOW
GLUCOSE QUALITATIVE U: NEGATIVE MG/DL
KETONES URINE: NEGATIVE MG/DL
LEUKOCYTE ESTERASE URINE: NEGATIVE
NITRITE URINE: NEGATIVE
PH URINE: 5.5
PROTEIN URINE: NEGATIVE MG/DL
SPECIFIC GRAVITY URINE: 1.02
UROBILINOGEN URINE: 0.2 MG/DL

## 2023-07-14 ENCOUNTER — TRANSCRIPTION ENCOUNTER (OUTPATIENT)
Age: 38
End: 2023-07-14

## 2023-07-17 ENCOUNTER — TRANSCRIPTION ENCOUNTER (OUTPATIENT)
Age: 38
End: 2023-07-17

## 2023-07-21 ENCOUNTER — APPOINTMENT (OUTPATIENT)
Dept: INTERNAL MEDICINE | Facility: CLINIC | Age: 38
End: 2023-07-21
Payer: COMMERCIAL

## 2023-07-21 PROCEDURE — 97803 MED NUTRITION INDIV SUBSEQ: CPT | Mod: 95

## 2023-07-27 ENCOUNTER — APPOINTMENT (OUTPATIENT)
Dept: PSYCHIATRY | Facility: CLINIC | Age: 38
End: 2023-07-27
Payer: COMMERCIAL

## 2023-07-27 DIAGNOSIS — R63.2 POLYPHAGIA: ICD-10-CM

## 2023-07-27 PROCEDURE — 99215 OFFICE O/P EST HI 40 MIN: CPT

## 2023-07-27 RX ORDER — ALPRAZOLAM 0.5 MG/1
0.5 TABLET ORAL
Qty: 30 | Refills: 0 | Status: ACTIVE | COMMUNITY
Start: 2023-07-11 | End: 1900-01-01

## 2023-07-27 NOTE — PAST MEDICAL HISTORY
[FreeTextEntry1] : Past Psyc Hx: Pt was being treated by FNP since  for anxiety /depression. Her NP became an PNP and she continued care with her . She is currently on Lexapro 20 mg  feels its is not working as well. Has tried Cymbalta and Rexulti in the past. She has been treated with Vyvanse for binge eating but stopped as it made her more anxious.  Pt is looking to switch providers because she feels her relationship with her provider in not therapeutic anymore. \par \par Past Psych Hospitalization: None\par \par Psychotherapy:  None\par \par Suicide attempt:  Denies\par \par SIB: Denies \par \par  \par \par Allergies: Bactrim  \par \par  \par \par Major Medical Problems:   Back pain, genital herpes\par \par Prescription medications:  Lexapro, Lyrica, Valtrex\par \par OTC medications: None \par \par TBI: None \par \par Seizures: \par \par Migraine HA: Has it most days, last one was yesterday. On Rizatriptan, Nabumetone and sometimes gets steroids\par \par  \par \par Surgery: None\par \par  \par \par Substance Use History: \par \par  \par \par Nicotine: Denies  \par \par Alcohol: Denies  \par \par CAGE Questionnaire : negative \par \par Illicit drugs: Denies \par \par Withdrawal: n/a \par \par Hospitalizations: n/a \par \par Detox/Rehab tx: n/a \par \par  \par \par Developmental History \par \par  \par \par Pre/ problems: Unremarkable \par \par Developmental milestones: Unremarkable \par \par  \par \par  \par \par OBGYN Hx: \par \par Menstrual cycle: Menarche: 10 y/o , LMP:  Current\par \par  \par \par Pregnancies/Miscarriage: None\par \par  \par \par Menopause: n/a\par \par

## 2023-07-27 NOTE — HISTORY OF PRESENT ILLNESS
[FreeTextEntry1] : Patient seen for transfer of care.  Had been depressed not doing well.  Saw primary care physician in the drop the Lexapro to 10 mg started Zoloft 50 mg.  On that dose feeling a little bit better.  Was not able to tolerate the Wellbutrin as she was having a lot of brain fog with it.  Stressful about being older not being  not having kids.  Work stressful took some time off.  Having issues with binge eating to deal with stress. [de-identified] :    Intake:    37 y/o  seen today for intake and initial MH evaluation. 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 Pt is single , never  has no children. Pt lives alone. \par \par       Chief compliant:  Establish care and medication management\par \par      History of present illness:  Pt was being treated by FNP since 2011 for anxiety /depression. Her NP became an PNP and she continued care with her . She is currently on Lexapro 20 mg  feels its is not working as well. Has tried Cymbalta and Rexulti in the past. She has been treated with Vyvanse for binge eating but stopped as it made her more anxious.  Pt is looking to switch providers because she feels her relationship with her provider in not therapeutic anymore. Some of her recent stressors  are in Nov she had an accident while on Vacation in [place], in December she got COVID and her migraine episodes  have  increased, in December she was diagnosed with genital herpes. She is constantly worried about her being single and not settling down. \par \par      Depression:  Increased sad mood in the past 2 months. Guilty about not having a good relationship with her parents, Her sleep is ok but does not feel well rested the next day. Is very tired .Pt states she snores at night. Has very low motivation and energy . Feels helpless and hopeless most of the time. Denies SI/HI. Her appetite is good\par \par      Maria Elena/Hypomania:  Denies episodes in the recent or remote past \par \par      Anxiety:  Pt states she worries all the time about her future. Denies Panic attacks or sx consistent with OCD/PTSD\par \par       Eating Disorder:  Pt was diagnoses with binge eating in the past and started on Vyvanse but stopped as it made her anxious\par \par      Psychosis: Denies AH/VH, does not illicit any delusional thoughts during this assessment.

## 2023-07-27 NOTE — SOCIAL HISTORY
[FreeTextEntry1] : Social History:  \par \par Born and raised:  in [place]\par \par Siblings: 1 brother and 1 sister\par \par Parents: Father is a , mom is a \par \par Childhood: was not the best, ,mom was always depressed and  could not get out of bed. Being the oldest she took care of the family , making feeding bottles for her younger siblings, cleaning and cooking. \par \par Education: She was a B+ student. Always struggled with anxiety but never told anyone. Got her biology degree from [employer]. 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 \par \par Relationship with parents:  not the best "on and off."  After graduation she left her parents house to live alone\par \par Relationship with siblings:  close to sister. Her brother came out as patton, relationship is improving with him\par \par Romantic relationship/marriage:  Single, has had couple of serious relationships that did not work out. \par \par  \par \par Legal Hx: Denies \par \par  \par \par Access to firearms: Denies \par \par

## 2023-07-27 NOTE — FAMILY HISTORY
[FreeTextEntry1] : Family History: \par \par  \par \par Psychiatric: Mom has depression, anxiety, takes ativan.  Grandma has anxiety/depression. Schizophrenia on mom's side of the family\par \par Substance use: None \par \par Suicide: None \par \par Neurological/medical: Mom and sister has migraine\par \par Autoimmune disorders: None \par \par Dementia: None \par \par Cancer: None \par \par Metabolic: None \par \par Epilepsy: None \par \par Migraine: None \par \par Brain aneurysms: None \par \par Movement disorders: None \par \par Thyroid: None \par \par Cardiac: None

## 2023-07-31 ENCOUNTER — APPOINTMENT (OUTPATIENT)
Dept: INTERNAL MEDICINE | Facility: CLINIC | Age: 38
End: 2023-07-31
Payer: COMMERCIAL

## 2023-07-31 ENCOUNTER — APPOINTMENT (OUTPATIENT)
Dept: INTERNAL MEDICINE | Facility: CLINIC | Age: 38
End: 2023-07-31

## 2023-07-31 PROCEDURE — 99213 OFFICE O/P EST LOW 20 MIN: CPT

## 2023-07-31 NOTE — HISTORY OF PRESENT ILLNESS
[de-identified] : 38 yo f with hx of depression, binge eating disorder -overall feeling much better - s/p psychiatry eval - now up to sertraline 100mg daily, remains on low dose lexapro - taking alprazolam occasionally  feels ready to go back to work

## 2023-07-31 NOTE — PLAN
[FreeTextEntry1] : Depression  continue sertraline and stop lexapro - follow up with therapist and psychiatrist  binge eating- close f/up with nutrition team  letter for work

## 2023-08-02 ENCOUNTER — TRANSCRIPTION ENCOUNTER (OUTPATIENT)
Age: 38
End: 2023-08-02

## 2023-08-29 ENCOUNTER — NON-APPOINTMENT (OUTPATIENT)
Age: 38
End: 2023-08-29

## 2023-08-31 ENCOUNTER — TRANSCRIPTION ENCOUNTER (OUTPATIENT)
Age: 38
End: 2023-08-31

## 2023-09-01 ENCOUNTER — APPOINTMENT (OUTPATIENT)
Dept: INTERNAL MEDICINE | Facility: CLINIC | Age: 38
End: 2023-09-01
Payer: COMMERCIAL

## 2023-09-01 PROCEDURE — 97803 MED NUTRITION INDIV SUBSEQ: CPT | Mod: 95

## 2023-09-18 ENCOUNTER — APPOINTMENT (OUTPATIENT)
Dept: INTERNAL MEDICINE | Facility: CLINIC | Age: 38
End: 2023-09-18
Payer: COMMERCIAL

## 2023-09-18 PROCEDURE — 97803 MED NUTRITION INDIV SUBSEQ: CPT | Mod: 95

## 2023-09-20 ENCOUNTER — EMERGENCY (EMERGENCY)
Facility: HOSPITAL | Age: 38
LOS: 1 days | Discharge: HOME | End: 2023-09-20
Attending: STUDENT IN AN ORGANIZED HEALTH CARE EDUCATION/TRAINING PROGRAM | Admitting: STUDENT IN AN ORGANIZED HEALTH CARE EDUCATION/TRAINING PROGRAM
Payer: COMMERCIAL

## 2023-09-20 VITALS
TEMPERATURE: 99 F | DIASTOLIC BLOOD PRESSURE: 83 MMHG | OXYGEN SATURATION: 96 % | HEIGHT: 64 IN | SYSTOLIC BLOOD PRESSURE: 127 MMHG | HEART RATE: 82 BPM | RESPIRATION RATE: 16 BRPM | WEIGHT: 195.11 LBS

## 2023-09-20 VITALS
TEMPERATURE: 98 F | OXYGEN SATURATION: 97 % | RESPIRATION RATE: 16 BRPM | DIASTOLIC BLOOD PRESSURE: 79 MMHG | HEART RATE: 76 BPM | SYSTOLIC BLOOD PRESSURE: 135 MMHG
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
## 2023-09-20 DIAGNOSIS — G43.909 MIGRAINE, UNSPECIFIED, NOT INTRACTABLE, WITHOUT STATUS MIGRAINOSUS: ICD-10-CM

## 2023-09-20 DIAGNOSIS — S61.230A PUNCTURE WOUND WITHOUT FOREIGN BODY OF RIGHT INDEX FINGER WITHOUT DAMAGE TO NAIL, INITIAL ENCOUNTER: ICD-10-CM

## 2023-09-20 DIAGNOSIS — J45.909 UNSPECIFIED ASTHMA, UNCOMPLICATED: ICD-10-CM

## 2023-09-20 DIAGNOSIS — Z23 ENCOUNTER FOR IMMUNIZATION: ICD-10-CM

## 2023-09-20 DIAGNOSIS — S60.410A ABRASION OF RIGHT INDEX FINGER, INITIAL ENCOUNTER: ICD-10-CM

## 2023-09-20 DIAGNOSIS — Z88.2 ALLERGY STATUS TO SULFONAMIDES: ICD-10-CM

## 2023-09-20 DIAGNOSIS — F32.9 MAJOR DEPRESSIVE DISORDER, SINGLE EPISODE, UNSPECIFIED: ICD-10-CM

## 2023-09-20 LAB
ALBUMIN SERPL ELPH-MCNC: 4.3 G/DL — SIGNIFICANT CHANGE UP (ref 3.3–5)
ALP SERPL-CCNC: 113 U/L — SIGNIFICANT CHANGE UP (ref 40–120)
ALT FLD-CCNC: 20 U/L — SIGNIFICANT CHANGE UP (ref 10–45)
ANION GAP SERPL CALC-SCNC: 8 MMOL/L — SIGNIFICANT CHANGE UP (ref 5–17)
AST SERPL-CCNC: 18 U/L — SIGNIFICANT CHANGE UP (ref 10–40)
BASOPHILS # BLD AUTO: 0.03 K/UL — SIGNIFICANT CHANGE UP (ref 0–0.2)
BASOPHILS NFR BLD AUTO: 0.3 % — SIGNIFICANT CHANGE UP (ref 0–2)
BILIRUB DIRECT SERPL-MCNC: 0.2 MG/DL — SIGNIFICANT CHANGE UP (ref 0–0.3)
BILIRUB INDIRECT FLD-MCNC: 0 MG/DL — LOW (ref 0.2–1)
BILIRUB SERPL-MCNC: 0.2 MG/DL — SIGNIFICANT CHANGE UP (ref 0.2–1.2)
BUN SERPL-MCNC: 16 MG/DL — SIGNIFICANT CHANGE UP (ref 7–23)
CALCIUM SERPL-MCNC: 9.4 MG/DL — SIGNIFICANT CHANGE UP (ref 8.4–10.5)
CHLORIDE SERPL-SCNC: 103 MMOL/L — SIGNIFICANT CHANGE UP (ref 96–108)
CO2 SERPL-SCNC: 27 MMOL/L — SIGNIFICANT CHANGE UP (ref 22–31)
CREAT SERPL-MCNC: 0.85 MG/DL — SIGNIFICANT CHANGE UP (ref 0.5–1.3)
EGFR: 90 ML/MIN/1.73M2 — SIGNIFICANT CHANGE UP
EOSINOPHIL # BLD AUTO: 0.15 K/UL — SIGNIFICANT CHANGE UP (ref 0–0.5)
EOSINOPHIL NFR BLD AUTO: 1.5 % — SIGNIFICANT CHANGE UP (ref 0–6)
GLUCOSE SERPL-MCNC: 122 MG/DL — HIGH (ref 70–99)
HBV SURFACE AB SER-ACNC: REACTIVE
HBV SURFACE AG SER-ACNC: SIGNIFICANT CHANGE UP
HCG SERPL-ACNC: <0 MIU/ML — SIGNIFICANT CHANGE UP
HCT VFR BLD CALC: 36.7 % — SIGNIFICANT CHANGE UP (ref 34.5–45)
HCV AB S/CO SERPL IA: 0.04 S/CO — SIGNIFICANT CHANGE UP
HCV AB SERPL-IMP: SIGNIFICANT CHANGE UP
HGB BLD-MCNC: 12.2 G/DL — SIGNIFICANT CHANGE UP (ref 11.5–15.5)
HIV 1+2 AB+HIV1 P24 AG SERPL QL IA: SIGNIFICANT CHANGE UP
IMM GRANULOCYTES NFR BLD AUTO: 0.4 % — SIGNIFICANT CHANGE UP (ref 0–0.9)
LYMPHOCYTES # BLD AUTO: 2.84 K/UL — SIGNIFICANT CHANGE UP (ref 1–3.3)
LYMPHOCYTES # BLD AUTO: 27.8 % — SIGNIFICANT CHANGE UP (ref 13–44)
MCHC RBC-ENTMCNC: 29.3 PG — SIGNIFICANT CHANGE UP (ref 27–34)
MCHC RBC-ENTMCNC: 33.2 GM/DL — SIGNIFICANT CHANGE UP (ref 32–36)
MCV RBC AUTO: 88 FL — SIGNIFICANT CHANGE UP (ref 80–100)
MONOCYTES # BLD AUTO: 0.38 K/UL — SIGNIFICANT CHANGE UP (ref 0–0.9)
MONOCYTES NFR BLD AUTO: 3.7 % — SIGNIFICANT CHANGE UP (ref 2–14)
NEUTROPHILS # BLD AUTO: 6.77 K/UL — SIGNIFICANT CHANGE UP (ref 1.8–7.4)
NEUTROPHILS NFR BLD AUTO: 66.3 % — SIGNIFICANT CHANGE UP (ref 43–77)
NRBC # BLD: 0 /100 WBCS — SIGNIFICANT CHANGE UP (ref 0–0)
PLATELET # BLD AUTO: 489 K/UL — HIGH (ref 150–400)
POTASSIUM SERPL-MCNC: 3.9 MMOL/L — SIGNIFICANT CHANGE UP (ref 3.5–5.3)
POTASSIUM SERPL-SCNC: 3.9 MMOL/L — SIGNIFICANT CHANGE UP (ref 3.5–5.3)
PROT SERPL-MCNC: 7.4 G/DL — SIGNIFICANT CHANGE UP (ref 6–8.3)
RBC # BLD: 4.17 M/UL — SIGNIFICANT CHANGE UP (ref 3.8–5.2)
RBC # FLD: 13.2 % — SIGNIFICANT CHANGE UP (ref 10.3–14.5)
SODIUM SERPL-SCNC: 138 MMOL/L — SIGNIFICANT CHANGE UP (ref 135–145)
WBC # BLD: 10.21 K/UL — SIGNIFICANT CHANGE UP (ref 3.8–10.5)
WBC # FLD AUTO: 10.21 K/UL — SIGNIFICANT CHANGE UP (ref 3.8–10.5)

## 2023-09-20 PROCEDURE — 86803 HEPATITIS C AB TEST: CPT

## 2023-09-20 PROCEDURE — 99284 EMERGENCY DEPT VISIT MOD MDM: CPT

## 2023-09-20 PROCEDURE — 99283 EMERGENCY DEPT VISIT LOW MDM: CPT | Mod: 25

## 2023-09-20 PROCEDURE — 86706 HEP B SURFACE ANTIBODY: CPT

## 2023-09-20 PROCEDURE — 36415 COLL VENOUS BLD VENIPUNCTURE: CPT

## 2023-09-20 PROCEDURE — 84702 CHORIONIC GONADOTROPIN TEST: CPT

## 2023-09-20 PROCEDURE — 80076 HEPATIC FUNCTION PANEL: CPT

## 2023-09-20 PROCEDURE — 90715 TDAP VACCINE 7 YRS/> IM: CPT

## 2023-09-20 PROCEDURE — 85025 COMPLETE CBC W/AUTO DIFF WBC: CPT

## 2023-09-20 PROCEDURE — 80048 BASIC METABOLIC PNL TOTAL CA: CPT

## 2023-09-20 PROCEDURE — 87340 HEPATITIS B SURFACE AG IA: CPT

## 2023-09-20 PROCEDURE — 87389 HIV-1 AG W/HIV-1&-2 AB AG IA: CPT

## 2023-09-20 PROCEDURE — 87521 HEPATITIS C PROBE&RVRS TRNSC: CPT

## 2023-09-20 PROCEDURE — 90471 IMMUNIZATION ADMIN: CPT

## 2023-09-20 RX ORDER — TETANUS TOXOID, REDUCED DIPHTHERIA TOXOID AND ACELLULAR PERTUSSIS VACCINE, ADSORBED 5; 2.5; 8; 8; 2.5 [IU]/.5ML; [IU]/.5ML; UG/.5ML; UG/.5ML; UG/.5ML
0.5 SUSPENSION INTRAMUSCULAR ONCE
Refills: 0 | Status: COMPLETED | OUTPATIENT
Start: 2023-09-20 | End: 2023-09-20

## 2023-09-20 RX ORDER — RALTEGRAVIR 400 MG/1
1 TABLET, FILM COATED ORAL
Qty: 56 | Refills: 0
Start: 2023-09-20 | End: 2023-10-17

## 2023-09-20 RX ORDER — EMTRICITABINE AND TENOFOVIR DISOPROXIL FUMARATE 200; 300 MG/1; MG/1
1 TABLET, FILM COATED ORAL ONCE
Refills: 0 | Status: COMPLETED | OUTPATIENT
Start: 2023-09-20 | End: 2023-09-20

## 2023-09-20 RX ORDER — EMTRICITABINE AND TENOFOVIR DISOPROXIL FUMARATE 200; 300 MG/1; MG/1
1 TABLET, FILM COATED ORAL
Qty: 28 | Refills: 0
Start: 2023-09-20 | End: 2023-10-17

## 2023-09-20 RX ORDER — RALTEGRAVIR 400 MG/1
400 TABLET, FILM COATED ORAL ONCE
Refills: 0 | Status: COMPLETED | OUTPATIENT
Start: 2023-09-20 | End: 2023-09-20

## 2023-09-20 RX ADMIN — EMTRICITABINE AND TENOFOVIR DISOPROXIL FUMARATE 1 TABLET(S): 200; 300 TABLET, FILM COATED ORAL at 18:16

## 2023-09-20 RX ADMIN — TETANUS TOXOID, REDUCED DIPHTHERIA TOXOID AND ACELLULAR PERTUSSIS VACCINE, ADSORBED 0.5 MILLILITER(S): 5; 2.5; 8; 8; 2.5 SUSPENSION INTRAMUSCULAR at 18:07

## 2023-09-20 RX ADMIN — RALTEGRAVIR 400 MILLIGRAM(S): 400 TABLET, FILM COATED ORAL at 18:16

## 2023-09-20 NOTE — ED PROVIDER NOTE - ATTENDING APP SHARED VISIT CONTRIBUTION OF CARE
36 yo F, PMHX migraines, depression, asthma, [employer] employee, here s/p needlestick today in R second finger, was obtaining cord from from [place], thinks 's mothers blood may have been mixed with the cord blood. hiv status of  and mother unknown, although mother did have negative hiv test 6 m/a. mother is not readily agreeing to repeat hiv test at this time. no known risk factors for  or mother for hiv. On exam, vs wnl,     GENERAL:  Awake, alert and in NAD, non-toxic appearing  ENMT: Airway patent  EYES: conjunctiva clear  CARDIAC: Regular rate, regular rhythm.  Heart sounds S1, S2, no S3, S4. No murmurs, rubs or gallops.  RESPIRATORY: Breath sounds clear and equal in bilateral anterior lung fields, no wheezes/ronchi/crackles/stridor; pt breathing and speaking comfortably with no increased WOB, no accessory mm. use, no intercostal retractions, no nasal flaring  GI: abdomen soft, non-distended, non-tender, no rebound or guarding.  SKIN: warm and dry, no rashes  PSYCH: awake, alert, calm and cooperative  BUE: 5/5 motor strength bilat deltoid, biceps, triceps, wrist flexors/extensors, hand . sensation intact to light touch bilat C5-T1; 2+ radial pulses bilat; compartment soft, skin warm and dry   right second digit with small superficial abrasion, no edema/active bleeding, cap refill < 3 sec  NEURO: gcs 15    plan: hcg neg, labs grossly wnl, hbv antigen +. shared medical decision making with pt regarding low risk of hiv transmission via needlestick with unknown hiv status of source pt however pt requests hiv pep as neonates mother not agreeing to hiv testing. will give pep and dc with pmd f/u

## 2023-09-20 NOTE — ED PROVIDER NOTE - CLINICAL SUMMARY MEDICAL DECISION MAKING FREE TEXT BOX
38 yo F, [employer] employee, PMHx migraines, depression, asthma, here s/p needlestick that occurred shortly prior to arrival.  VSS, afebrile, well-appearing.    +fluid exposure 2/2 needlestick.  D/w pt at length regarding recommended testing, pt verbalized consent- baseline labs including hepatitis and HIV sent.  D/w pt at length regarding likelihood of HIV transmission as well as prophylaxis including but not limited to potential SEs and risks vs. benefits.  Pt requesting HIV prophylaxis- 1st dose given in ED and courses sent to pharmacy, d/w with ED pharmacist.  Tetanus updated.  Advised F/U with [facility].  Provided home care instructions and strict return precautions.  Encouraged PCP F/U.

## 2023-09-20 NOTE — ED PROVIDER NOTE - SKIN WOUND TYPE
R 2nd digit with small superficial abrasion at site of injury. No bleeding, drainage, swelling, or cellulitic erythema.

## 2023-09-20 NOTE — ED PROVIDER NOTE - PATIENT PORTAL LINK FT
You can access the FollowMyHealth Patient Portal offered by [facility] by registering at the following website: [email or web address]. By joining [employer]’s FollowMyHealth portal, you will also be able to view your health information using other applications (apps) compatible with our system.

## 2023-09-20 NOTE — ED ADULT NURSE NOTE - OBJECTIVE STATEMENT
Pt A&Ox4 and able to speak in complete sentences. Pt breathing even and unlabored with equal chest rise and fall. pt arrived d/t needle stick to right second finger. Pt endorsed exposure pt did not have hx of drug use, HIV, or hep c. pt denies n, v, lightheadedness, dizziness, sob, cp, fevers, chills.

## 2023-09-20 NOTE — ED ADULT TRIAGE NOTE - MODE OF ARRIVAL
Walk in Olumiant Pregnancy And Lactation Text: Based on animal studies, Olumiant may cause embryo-fetal harm when administered to pregnant women.  The medication should not be used in pregnancy.  Breastfeeding is not recommended during treatment.

## 2023-09-20 NOTE — ED PROVIDER NOTE - OBJECTIVE STATEMENT
38 yo F, PMHx 38 yo F, PMHX migraines, depression, asthma, [employer] employee, here s/p needlestick today. States works shortly prior to arrival was obtaining cord blood from a 38 yo F, [employer] employee, PMHx migraines, depression, asthma, here s/p needlestick that occurred shortly prior to arrival. [employer] [facility] was at work obtaining cord blood from an infant when pt was accidentally stuck to R 2nd finger with needle, states believes some of the mother's blood was mixed in with the cord blood. [facility] [employer] [facility] is unsure if mother will be willing to do hep/HIV testing.    Denying any symptoms at this time.

## 2023-09-21 LAB
HCV RNA FLD QL NAA+PROBE: SIGNIFICANT CHANGE UP
HCV RNA SPEC QL PROBE+SIG AMP: SIGNIFICANT CHANGE UP

## 2023-10-02 ENCOUNTER — APPOINTMENT (OUTPATIENT)
Dept: INTERNAL MEDICINE | Facility: CLINIC | Age: 38
End: 2023-10-02
Payer: COMMERCIAL

## 2023-10-02 PROCEDURE — 97803 MED NUTRITION INDIV SUBSEQ: CPT | Mod: 95

## 2023-10-03 PROBLEM — J45.909 UNSPECIFIED ASTHMA, UNCOMPLICATED: Chronic | Status: ACTIVE | Noted: 2023-09-20

## 2023-10-03 PROBLEM — G43.909 MIGRAINE, UNSPECIFIED, NOT INTRACTABLE, WITHOUT STATUS MIGRAINOSUS: Chronic | Status: ACTIVE | Noted: 2023-09-20

## 2023-10-03 PROBLEM — F32.9 MAJOR DEPRESSIVE DISORDER, SINGLE EPISODE, UNSPECIFIED: Chronic | Status: ACTIVE | Noted: 2023-09-20

## 2023-10-09 ENCOUNTER — APPOINTMENT (OUTPATIENT)
Dept: INTERNAL MEDICINE | Facility: CLINIC | Age: 38
End: 2023-10-09
Payer: COMMERCIAL

## 2023-10-09 ENCOUNTER — NON-APPOINTMENT (OUTPATIENT)
Age: 38
End: 2023-10-09

## 2023-10-09 DIAGNOSIS — F50.81 BINGE EATING DISORDER: ICD-10-CM

## 2023-10-09 PROCEDURE — 97803 MED NUTRITION INDIV SUBSEQ: CPT | Mod: 95

## 2023-10-11 ENCOUNTER — NON-APPOINTMENT (OUTPATIENT)
Age: 38
End: 2023-10-11

## 2023-10-14 ENCOUNTER — NON-APPOINTMENT (OUTPATIENT)
Age: 38
End: 2023-10-14

## 2023-10-16 ENCOUNTER — APPOINTMENT (OUTPATIENT)
Dept: INTERNAL MEDICINE | Facility: CLINIC | Age: 38
End: 2023-10-16

## 2023-11-14 NOTE — ED PROVIDER NOTE - CPE EDP GASTRO NORM
PAST MEDICAL HISTORY:  Bladder disorder pt self catheterizes    Depression     Insomnia     Tumor spinal     normal...

## 2024-01-02 ENCOUNTER — TRANSCRIPTION ENCOUNTER (OUTPATIENT)
Age: 39
End: 2024-01-02

## 2024-01-22 ENCOUNTER — TRANSCRIPTION ENCOUNTER (OUTPATIENT)
Age: 39
End: 2024-01-22

## 2024-01-23 ENCOUNTER — LABORATORY RESULT (OUTPATIENT)
Age: 39
End: 2024-01-23

## 2024-01-23 ENCOUNTER — APPOINTMENT (OUTPATIENT)
Dept: INTERNAL MEDICINE | Facility: CLINIC | Age: 39
End: 2024-01-23
Payer: COMMERCIAL

## 2024-01-23 VITALS
BODY MASS INDEX: 35.68 KG/M2 | OXYGEN SATURATION: 96 % | WEIGHT: 209 LBS | DIASTOLIC BLOOD PRESSURE: 88 MMHG | SYSTOLIC BLOOD PRESSURE: 130 MMHG | HEART RATE: 68 BPM | HEIGHT: 64 IN | TEMPERATURE: 98.2 F | RESPIRATION RATE: 12 BRPM

## 2024-01-23 DIAGNOSIS — F41.9 ANXIETY DISORDER, UNSPECIFIED: ICD-10-CM

## 2024-01-23 DIAGNOSIS — M25.50 PAIN IN UNSPECIFIED JOINT: ICD-10-CM

## 2024-01-23 DIAGNOSIS — F32.A ANXIETY DISORDER, UNSPECIFIED: ICD-10-CM

## 2024-01-23 LAB
ALBUMIN SERPL ELPH-MCNC: 4.2 G/DL
ALP BLD-CCNC: 98 U/L
ALT SERPL-CCNC: 24 U/L
ANION GAP SERPL CALC-SCNC: 11 MMOL/L
APPEARANCE: CLEAR
AST SERPL-CCNC: 18 U/L
BASOPHILS # BLD AUTO: 0.05 K/UL
BASOPHILS NFR BLD AUTO: 0.5 %
BILIRUB SERPL-MCNC: 0.2 MG/DL
BILIRUBIN URINE: NEGATIVE
BLOOD URINE: ABNORMAL
BUN SERPL-MCNC: 17 MG/DL
CALCIUM SERPL-MCNC: 9.3 MG/DL
CHLORIDE SERPL-SCNC: 101 MMOL/L
CHOLEST SERPL-MCNC: 229 MG/DL
CO2 SERPL-SCNC: 24 MMOL/L
COLOR: YELLOW
CREAT SERPL-MCNC: 0.68 MG/DL
CRP SERPL-MCNC: 16 MG/L
EGFR: 114 ML/MIN/1.73M2
EOSINOPHIL # BLD AUTO: 0.13 K/UL
EOSINOPHIL NFR BLD AUTO: 1.3 %
ERYTHROCYTE [SEDIMENTATION RATE] IN BLOOD BY WESTERGREN METHOD: 19 MM/HR
ESTIMATED AVERAGE GLUCOSE: 114 MG/DL
FOLATE SERPL-MCNC: 12.1 NG/ML
GLUCOSE QUALITATIVE U: NEGATIVE MG/DL
GLUCOSE SERPL-MCNC: 113 MG/DL
HBA1C MFR BLD HPLC: 5.6 %
HCT VFR BLD CALC: 36.8 %
HDLC SERPL-MCNC: 56 MG/DL
HGB BLD-MCNC: 11.4 G/DL
IMM GRANULOCYTES NFR BLD AUTO: 0.7 %
KETONES URINE: NEGATIVE MG/DL
LDLC SERPL CALC-MCNC: 139 MG/DL
LEUKOCYTE ESTERASE URINE: NEGATIVE
LYMPHOCYTES # BLD AUTO: 3.13 K/UL
LYMPHOCYTES NFR BLD AUTO: 32.4 %
MAN DIFF?: NORMAL
MCHC RBC-ENTMCNC: 28.2 PG
MCHC RBC-ENTMCNC: 31 GM/DL
MCV RBC AUTO: 91.1 FL
MONOCYTES # BLD AUTO: 0.4 K/UL
MONOCYTES NFR BLD AUTO: 4.1 %
NEUTROPHILS # BLD AUTO: 5.88 K/UL
NEUTROPHILS NFR BLD AUTO: 61 %
NITRITE URINE: NEGATIVE
NONHDLC SERPL-MCNC: 173 MG/DL
PH URINE: 6
PLATELET # BLD AUTO: 511 K/UL
POTASSIUM SERPL-SCNC: 4.1 MMOL/L
PROT SERPL-MCNC: 6.9 G/DL
PROTEIN URINE: NEGATIVE MG/DL
RBC # BLD: 4.04 M/UL
RBC # FLD: 14.1 %
SODIUM SERPL-SCNC: 137 MMOL/L
SPECIFIC GRAVITY URINE: 1.01
T3FREE SERPL-MCNC: 3.04 PG/ML
T4 FREE SERPL-MCNC: 0.9 NG/DL
TRIGL SERPL-MCNC: 190 MG/DL
TSH SERPL-ACNC: 2.57 UIU/ML
UROBILINOGEN URINE: 0.2 MG/DL
VIT B12 SERPL-MCNC: 517 PG/ML
WBC # FLD AUTO: 9.66 K/UL

## 2024-01-23 PROCEDURE — 99215 OFFICE O/P EST HI 40 MIN: CPT | Mod: 25

## 2024-01-23 PROCEDURE — 36415 COLL VENOUS BLD VENIPUNCTURE: CPT

## 2024-01-23 NOTE — HISTORY OF PRESENT ILLNESS
[de-identified] : 39 yo f with hx of depression, binge eating disorder  Has gained 9 lbs in 2 weeks, arms feel swollen.   Normally regular period but missed this month  LMP 11/30.   Brain Fog.  and memory recall concerns.   Constant bloating -this is new.    Taking sertraline 100mg.  Takes pregabalin daily to help prevent migraines.   no vision changes.  joint pain more so recently.    no rash no fever.  feels warm.   No chance for pregnancy.   binge eating has been worse these past few weeks.        reports constipation.   Sleep - very tired during the day.  more fatigued than in the past.    No recent travel  no bug bites.   Now off valtrex- no longer having outbreaks.  Takes magnesium at night, lysine in the AM.    liquid MVI.    With eating- not satiated.   eggs in am.  sald for lunch, days off work - more processed food.

## 2024-01-23 NOTE — PLAN
[FreeTextEntry1] : Brain fog, fatigue, joint pain with elevated ESR and CRP.  No overt evidence of infection.  Autoimmune disease is a possibility - check labs today  lyme, RYAN, HCG,  TPO ab, RF,   sle,  - walk for exercise - reviewed a more plant based diet, less inflammatory-  sample menus given.- important to have structure on non working days  Psych follow up

## 2024-01-23 NOTE — PHYSICAL EXAM
[Coordination Grossly Intact] : coordination grossly intact [Normal Gait] : normal gait [Normal] : affect was normal and insight and judgment were intact [de-identified] : trace swelling bl ankles, no pitting edema [de-identified] : decreased reflexes b/lcn 2-12 intact

## 2024-01-23 NOTE — REVIEW OF SYSTEMS
[Fever] : no fever [Chills] : no chills [Fatigue] : fatigue [Night Sweats] : no night sweats [Recent Change In Weight] : ~T recent weight change [Constipation] : constipation [Joint Pain] : joint pain [Headache] : headache [Memory Loss] : memory loss [Anxiety] : anxiety [Depression] : depression [Negative] : Heme/Lymph

## 2024-01-25 ENCOUNTER — NON-APPOINTMENT (OUTPATIENT)
Age: 39
End: 2024-01-25

## 2024-01-25 LAB
CCP AB SER IA-ACNC: <8 UNITS
FSH SERPL-MCNC: 6.6 IU/L
HCG SERPL-MCNC: <1 MIU/ML
LH SERPL-ACNC: 11.6 IU/L
MAGNESIUM SERPL-MCNC: 2.3 MG/DL
RF+CCP IGG SER-IMP: NEGATIVE
RHEUMATOID FACT SER QL: <10 IU/ML
THYROPEROXIDASE AB SERPL IA-ACNC: <10 IU/ML

## 2024-01-26 ENCOUNTER — NON-APPOINTMENT (OUTPATIENT)
Age: 39
End: 2024-01-26

## 2024-01-26 LAB — ANA SER IF-ACNC: NEGATIVE

## 2024-01-29 LAB — DSDNA AB SER-ACNC: <12 IU/ML

## 2024-03-06 NOTE — ED ADULT TRIAGE NOTE - ADDITIONAL SAFETY/BANDS...
3/8/2024     Natalio Armstrong  [address]  [address] [facility] [address]    Dear Natalio,  Our mission at [facility] is to promote health, prevent illness, and provide state of the art treatment to best meet your needs.  With that in mind, your provider has requested that we contact you.  Dr. Lucia will no longer be seeing patients at our [place] location; however, you are able to continue seeing him at [facility] [facility] or [facility] [address].  To schedule, please call:   [place] [phone]  [facility] [phone]    Our office has been unable to reach you by phone to schedule an appointment along with arranging a CT Scan for follow up.   If you have any questions, please do not hesitate to call us at [phone].     Thank you,    Appointment Desk  [facility] [place]  [address] [facility] [address]  
Additional Safety/Bands:

## 2024-03-25 ENCOUNTER — RX RENEWAL (OUTPATIENT)
Age: 39
End: 2024-03-25

## 2024-03-25 RX ORDER — SERTRALINE HYDROCHLORIDE 100 MG/1
100 TABLET, FILM COATED ORAL DAILY
Qty: 30 | Refills: 5 | Status: ACTIVE | COMMUNITY
Start: 2023-07-11 | End: 1900-01-01

## 2024-04-23 ENCOUNTER — NON-APPOINTMENT (OUTPATIENT)
Age: 39
End: 2024-04-23

## 2024-04-24 RX ORDER — PREGABALIN 200 MG/1
200 CAPSULE ORAL DAILY
Qty: 30 | Refills: 4 | Status: ACTIVE | COMMUNITY
Start: 2023-06-02 | End: 1900-01-01

## 2024-06-07 ENCOUNTER — APPOINTMENT (OUTPATIENT)
Dept: INTERNAL MEDICINE | Facility: CLINIC | Age: 39
End: 2024-06-07
Payer: COMMERCIAL

## 2024-06-07 ENCOUNTER — APPOINTMENT (OUTPATIENT)
Dept: OTOLARYNGOLOGY | Facility: CLINIC | Age: 39
End: 2024-06-07
Payer: COMMERCIAL

## 2024-06-07 VITALS
DIASTOLIC BLOOD PRESSURE: 88 MMHG | BODY MASS INDEX: 35.68 KG/M2 | TEMPERATURE: 97.3 F | HEIGHT: 64 IN | OXYGEN SATURATION: 97 % | WEIGHT: 209 LBS | HEART RATE: 98 BPM | SYSTOLIC BLOOD PRESSURE: 125 MMHG | RESPIRATION RATE: 12 BRPM

## 2024-06-07 DIAGNOSIS — Z78.9 OTHER SPECIFIED HEALTH STATUS: ICD-10-CM

## 2024-06-07 DIAGNOSIS — H61.22 IMPACTED CERUMEN, LEFT EAR: ICD-10-CM

## 2024-06-07 DIAGNOSIS — Z82.49 FAMILY HISTORY OF ISCHEMIC HEART DISEASE AND OTHER DISEASES OF THE CIRCULATORY SYSTEM: ICD-10-CM

## 2024-06-07 DIAGNOSIS — G43.909 MIGRAINE, UNSPECIFIED, NOT INTRACTABLE, W/OUT STATUS MIGRAINOSUS: ICD-10-CM

## 2024-06-07 PROCEDURE — 99202 OFFICE O/P NEW SF 15 MIN: CPT | Mod: 25

## 2024-06-07 PROCEDURE — 99213 OFFICE O/P EST LOW 20 MIN: CPT

## 2024-06-07 PROCEDURE — 69210 REMOVE IMPACTED EAR WAX UNI: CPT | Mod: LT

## 2024-06-07 RX ORDER — SERTRALINE HYDROCHLORIDE 25 MG/1
TABLET, FILM COATED ORAL
Refills: 0 | Status: ACTIVE | COMMUNITY

## 2024-06-07 NOTE — PHYSICAL EXAM
[Normal] : no acute distress, well nourished, well developed and well-appearing [de-identified] : left sided cerumen impaction

## 2024-06-07 NOTE — HISTORY OF PRESENT ILLNESS
[de-identified] : 39 yo f with hx of depression, binge eating disorder  left ear fullness for one day - - has a hx of ear infections. but this feels different - no fever or chills, no sinus pressure, no cough.     note 1/23/24 Has gained 9 lbs in 2 weeks, arms feel swollen.   Normally regular period but missed this month  LMP 11/30.   Brain Fog.  and memory recall concerns.   Constant bloating -this is new.    Taking sertraline 100mg.  Takes pregabalin daily to help prevent migraines.   no vision changes.  joint pain more so recently.    no rash no fever.  feels warm.   No chance for pregnancy.   binge eating has been worse these past few weeks.        reports constipation.   Sleep - very tired during the day.  more fatigued than in the past.    No recent travel  no bug bites.   Now off valtrex- no longer having outbreaks.  Takes magnesium at night, lysine in the AM.    liquid MVI.    With eating- not satiated.   eggs in am.  sald for lunch, days off work - more processed food.      
caffeine

## 2024-06-07 NOTE — PLAN
[FreeTextEntry1] : unable to remove all of the cerumen - will arrange for ENT for today for suctioning.

## 2024-06-07 NOTE — HISTORY OF PRESENT ILLNESS
[de-identified] : 37 y/o F presents to the office with C/O Left ear cerumen impaction, decreased hearing, and dizziness.  cerumen impaction Left removed w suction

## 2024-06-07 NOTE — REASON FOR VISIT
[Initial Consultation] : an initial consultation for [Vertigo] : vertigo [FreeTextEntry2] : cerumen impaction Left removed w suction

## 2024-06-07 NOTE — PHYSICAL EXAM
[Midline] : trachea located in midline position [Normal] : no rashes [de-identified] : cerumen impaction Left removed w suction [de-identified] : cerumen impaction

## 2024-06-25 ENCOUNTER — NON-APPOINTMENT (OUTPATIENT)
Age: 39
End: 2024-06-25

## 2024-06-27 ENCOUNTER — APPOINTMENT (OUTPATIENT)
Dept: INTERNAL MEDICINE | Facility: CLINIC | Age: 39
End: 2024-06-27
Payer: COMMERCIAL

## 2024-06-27 ENCOUNTER — NON-APPOINTMENT (OUTPATIENT)
Age: 39
End: 2024-06-27

## 2024-06-27 VITALS
RESPIRATION RATE: 12 BRPM | HEIGHT: 64 IN | SYSTOLIC BLOOD PRESSURE: 135 MMHG | WEIGHT: 209 LBS | DIASTOLIC BLOOD PRESSURE: 89 MMHG | OXYGEN SATURATION: 97 % | BODY MASS INDEX: 35.68 KG/M2 | TEMPERATURE: 96.7 F | HEART RATE: 102 BPM

## 2024-06-27 DIAGNOSIS — U07.1 COVID-19: ICD-10-CM

## 2024-06-27 DIAGNOSIS — R53.81 OTHER MALAISE: ICD-10-CM

## 2024-06-27 DIAGNOSIS — R53.83 OTHER MALAISE: ICD-10-CM

## 2024-06-27 PROCEDURE — 87635 SARS-COV-2 COVID-19 AMP PRB: CPT | Mod: QW

## 2024-06-27 PROCEDURE — 99212 OFFICE O/P EST SF 10 MIN: CPT | Mod: 25

## 2024-09-19 ENCOUNTER — RX RENEWAL (OUTPATIENT)
Age: 39
End: 2024-09-19

## 2024-10-01 ENCOUNTER — APPOINTMENT (OUTPATIENT)
Dept: INTERNAL MEDICINE | Facility: CLINIC | Age: 39
End: 2024-10-01

## 2024-12-17 ENCOUNTER — NON-APPOINTMENT (OUTPATIENT)
Age: 39
End: 2024-12-17

## 2025-01-06 ENCOUNTER — APPOINTMENT (OUTPATIENT)
Dept: INTERNAL MEDICINE | Facility: CLINIC | Age: 40
End: 2025-01-06
Payer: COMMERCIAL

## 2025-01-06 VITALS
DIASTOLIC BLOOD PRESSURE: 89 MMHG | WEIGHT: 200 LBS | SYSTOLIC BLOOD PRESSURE: 137 MMHG | OXYGEN SATURATION: 97 % | HEIGHT: 64 IN | BODY MASS INDEX: 34.15 KG/M2 | RESPIRATION RATE: 12 BRPM | HEART RATE: 127 BPM | TEMPERATURE: 96.8 F

## 2025-01-06 DIAGNOSIS — J21.0 ACUTE BRONCHIOLITIS DUE TO RESPIRATORY SYNCYTIAL VIRUS: ICD-10-CM

## 2025-01-06 PROCEDURE — 99213 OFFICE O/P EST LOW 20 MIN: CPT

## 2025-01-08 RX ORDER — BUDESONIDE AND FORMOTEROL FUMARATE DIHYDRATE 80; 4.5 UG/1; UG/1
80-4.5 AEROSOL RESPIRATORY (INHALATION)
Qty: 1 | Refills: 11 | Status: ACTIVE | COMMUNITY
Start: 2025-01-08 | End: 1900-01-01

## 2025-01-15 ENCOUNTER — NON-APPOINTMENT (OUTPATIENT)
Age: 40
End: 2025-01-15

## 2025-02-25 ENCOUNTER — APPOINTMENT (OUTPATIENT)
Dept: INTERNAL MEDICINE | Facility: CLINIC | Age: 40
End: 2025-02-25
Payer: COMMERCIAL

## 2025-02-25 VITALS
WEIGHT: 215 LBS | DIASTOLIC BLOOD PRESSURE: 87 MMHG | RESPIRATION RATE: 12 BRPM | BODY MASS INDEX: 36.7 KG/M2 | HEART RATE: 77 BPM | HEIGHT: 64 IN | SYSTOLIC BLOOD PRESSURE: 145 MMHG | TEMPERATURE: 96.5 F | OXYGEN SATURATION: 98 %

## 2025-02-25 DIAGNOSIS — Z00.00 ENCOUNTER FOR GENERAL ADULT MEDICAL EXAMINATION W/OUT ABNORMAL FINDINGS: ICD-10-CM

## 2025-02-25 DIAGNOSIS — E66.9 OBESITY, UNSPECIFIED: ICD-10-CM

## 2025-02-25 PROCEDURE — 99395 PREV VISIT EST AGE 18-39: CPT

## 2025-02-25 PROCEDURE — 36415 COLL VENOUS BLD VENIPUNCTURE: CPT

## 2025-02-25 RX ORDER — TIRZEPATIDE 2.5 MG/.5ML
2.5 INJECTION, SOLUTION SUBCUTANEOUS
Qty: 4 | Refills: 1 | Status: ACTIVE | COMMUNITY
Start: 2025-02-25 | End: 1900-01-01

## 2025-02-27 ENCOUNTER — NON-APPOINTMENT (OUTPATIENT)
Age: 40
End: 2025-02-27

## 2025-02-27 DIAGNOSIS — E78.41 ELEVATED LIPOPROTEIN(A): ICD-10-CM

## 2025-02-27 LAB
25(OH)D3 SERPL-MCNC: 16.6 NG/ML
ALBUMIN SERPL ELPH-MCNC: 4.4 G/DL
ALP BLD-CCNC: 127 U/L
ALT SERPL-CCNC: 26 U/L
ANION GAP SERPL CALC-SCNC: 14 MMOL/L
APO LP(A) SERPL-MCNC: 163 NMOL/L
APPEARANCE: CLEAR
AST SERPL-CCNC: 19 U/L
BASOPHILS # BLD AUTO: 0.01 K/UL
BASOPHILS NFR BLD AUTO: 0.1 %
BILIRUB SERPL-MCNC: 0.3 MG/DL
BILIRUBIN URINE: NEGATIVE
BLOOD URINE: NEGATIVE
BUN SERPL-MCNC: 15 MG/DL
CALCIUM SERPL-MCNC: 9.9 MG/DL
CHLORIDE SERPL-SCNC: 103 MMOL/L
CHOLEST SERPL-MCNC: 235 MG/DL
CO2 SERPL-SCNC: 23 MMOL/L
COLOR: YELLOW
CREAT SERPL-MCNC: 0.73 MG/DL
EGFR: 107 ML/MIN/1.73M2
EOSINOPHIL # BLD AUTO: 0.13 K/UL
EOSINOPHIL NFR BLD AUTO: 1.7 %
ESTIMATED AVERAGE GLUCOSE: 120 MG/DL
FERRITIN SERPL-MCNC: 35 NG/ML
GLUCOSE QUALITATIVE U: NEGATIVE MG/DL
GLUCOSE SERPL-MCNC: 101 MG/DL
HBA1C MFR BLD HPLC: 5.8 %
HCT VFR BLD CALC: 41.9 %
HDLC SERPL-MCNC: 52 MG/DL
HGB BLD-MCNC: 12.6 G/DL
IMM GRANULOCYTES NFR BLD AUTO: 0.1 %
IRON SATN MFR SERPL: 16 %
IRON SERPL-MCNC: 63 UG/DL
KETONES URINE: NEGATIVE MG/DL
LDLC SERPL CALC-MCNC: 160 MG/DL
LEUKOCYTE ESTERASE URINE: NEGATIVE
LYMPHOCYTES # BLD AUTO: 2.15 K/UL
LYMPHOCYTES NFR BLD AUTO: 28.6 %
MAN DIFF?: NORMAL
MCHC RBC-ENTMCNC: 27.6 PG
MCHC RBC-ENTMCNC: 30.1 G/DL
MCV RBC AUTO: 91.9 FL
MONOCYTES # BLD AUTO: 0.35 K/UL
MONOCYTES NFR BLD AUTO: 4.7 %
NEUTROPHILS # BLD AUTO: 4.87 K/UL
NEUTROPHILS NFR BLD AUTO: 64.8 %
NITRITE URINE: NEGATIVE
NONHDLC SERPL-MCNC: 183 MG/DL
PH URINE: 6.5
PLATELET # BLD AUTO: 539 K/UL
POTASSIUM SERPL-SCNC: 4.7 MMOL/L
PROT SERPL-MCNC: 7.4 G/DL
PROTEIN URINE: NEGATIVE MG/DL
RBC # BLD: 4.56 M/UL
RBC # FLD: 14.5 %
SODIUM SERPL-SCNC: 140 MMOL/L
SPECIFIC GRAVITY URINE: 1.03
TIBC SERPL-MCNC: 384 UG/DL
TRIGL SERPL-MCNC: 129 MG/DL
TSH SERPL-ACNC: 3.36 UIU/ML
UIBC SERPL-MCNC: 321 UG/DL
UROBILINOGEN URINE: 0.2 MG/DL
WBC # FLD AUTO: 7.52 K/UL

## 2025-03-13 ENCOUNTER — APPOINTMENT (OUTPATIENT)
Dept: INTERNAL MEDICINE | Facility: CLINIC | Age: 40
End: 2025-03-13
Payer: COMMERCIAL

## 2025-03-13 VITALS
OXYGEN SATURATION: 97 % | RESPIRATION RATE: 12 BRPM | TEMPERATURE: 96.8 F | DIASTOLIC BLOOD PRESSURE: 64 MMHG | WEIGHT: 215 LBS | HEART RATE: 107 BPM | SYSTOLIC BLOOD PRESSURE: 121 MMHG | BODY MASS INDEX: 36.7 KG/M2 | HEIGHT: 64 IN

## 2025-03-13 DIAGNOSIS — R63.2 POLYPHAGIA: ICD-10-CM

## 2025-03-13 PROCEDURE — 99214 OFFICE O/P EST MOD 30 MIN: CPT

## 2025-03-19 ENCOUNTER — APPOINTMENT (OUTPATIENT)
Dept: INTERNAL MEDICINE | Facility: CLINIC | Age: 40
End: 2025-03-19
Payer: COMMERCIAL

## 2025-03-19 DIAGNOSIS — F41.9 ANXIETY DISORDER, UNSPECIFIED: ICD-10-CM

## 2025-03-19 DIAGNOSIS — F32.A ANXIETY DISORDER, UNSPECIFIED: ICD-10-CM

## 2025-03-19 PROCEDURE — 99214 OFFICE O/P EST MOD 30 MIN: CPT

## 2025-03-19 RX ORDER — DESVENLAFAXINE 50 MG/1
50 TABLET, EXTENDED RELEASE ORAL
Qty: 90 | Refills: 1 | Status: ACTIVE | COMMUNITY
Start: 2025-03-13 | End: 1900-01-01

## 2025-03-22 ENCOUNTER — EMERGENCY (EMERGENCY)
Facility: HOSPITAL | Age: 40
LOS: 1 days | Discharge: LEFT BEFORE DISCHARGE | End: 2025-03-22
Payer: COMMERCIAL

## 2025-03-22 PROCEDURE — L9992: CPT

## 2025-03-24 ENCOUNTER — NON-APPOINTMENT (OUTPATIENT)
Age: 40
End: 2025-03-24

## 2025-04-30 ENCOUNTER — EMERGENCY (EMERGENCY)
Facility: HOSPITAL | Age: 40
LOS: 1 days | End: 2025-04-30
Attending: EMERGENCY MEDICINE | Admitting: EMERGENCY MEDICINE
Payer: COMMERCIAL

## 2025-04-30 VITALS
WEIGHT: 220.02 LBS | SYSTOLIC BLOOD PRESSURE: 144 MMHG | DIASTOLIC BLOOD PRESSURE: 77 MMHG | RESPIRATION RATE: 20 BRPM | HEIGHT: 64 IN | TEMPERATURE: 98 F | HEART RATE: 105 BPM

## 2025-04-30 VITALS
SYSTOLIC BLOOD PRESSURE: 142 MMHG | DIASTOLIC BLOOD PRESSURE: 90 MMHG | RESPIRATION RATE: 18 BRPM | TEMPERATURE: 98 F | HEART RATE: 84 BPM | OXYGEN SATURATION: 99 %

## 2025-04-30 DIAGNOSIS — R51.9 HEADACHE, UNSPECIFIED: ICD-10-CM

## 2025-04-30 PROCEDURE — 96375 TX/PRO/DX INJ NEW DRUG ADDON: CPT

## 2025-04-30 PROCEDURE — 99285 EMERGENCY DEPT VISIT HI MDM: CPT

## 2025-04-30 PROCEDURE — 96374 THER/PROPH/DIAG INJ IV PUSH: CPT

## 2025-04-30 PROCEDURE — 94640 AIRWAY INHALATION TREATMENT: CPT

## 2025-04-30 PROCEDURE — 99284 EMERGENCY DEPT VISIT MOD MDM: CPT | Mod: 25

## 2025-04-30 RX ORDER — KETOROLAC TROMETHAMINE 30 MG/ML
30 INJECTION, SOLUTION INTRAMUSCULAR; INTRAVENOUS ONCE
Refills: 0 | Status: DISCONTINUED | OUTPATIENT
Start: 2025-04-30 | End: 2025-04-30

## 2025-04-30 RX ORDER — MAGNESIUM SULFATE 500 MG/ML
2 SYRINGE (ML) INJECTION ONCE
Refills: 0 | Status: COMPLETED | OUTPATIENT
Start: 2025-04-30 | End: 2025-04-30

## 2025-04-30 RX ORDER — DIPHENHYDRAMINE HCL 12.5MG/5ML
25 ELIXIR ORAL ONCE
Refills: 0 | Status: COMPLETED | OUTPATIENT
Start: 2025-04-30 | End: 2025-04-30

## 2025-04-30 RX ORDER — RIZATRIPTAN BENZOATE 10 MG/1
10 TABLET ORAL
Qty: 27 | Refills: 3 | Status: ACTIVE | COMMUNITY
Start: 2025-04-30 | End: 1900-01-01

## 2025-04-30 RX ORDER — ONDANSETRON HCL/PF 4 MG/2 ML
4 VIAL (ML) INJECTION ONCE
Refills: 0 | Status: COMPLETED | OUTPATIENT
Start: 2025-04-30 | End: 2025-04-30

## 2025-04-30 RX ORDER — LIDOCAINE HCL/PF 10 MG/ML
4 VIAL (ML) INJECTION ONCE
Refills: 0 | Status: COMPLETED | OUTPATIENT
Start: 2025-04-30 | End: 2025-04-30

## 2025-04-30 RX ADMIN — Medication 1000 MILLILITER(S): at 09:42

## 2025-04-30 RX ADMIN — Medication 4 MILLILITER(S): at 10:20

## 2025-04-30 RX ADMIN — Medication 150 GRAM(S): at 09:43

## 2025-04-30 RX ADMIN — Medication 25 MILLIGRAM(S): at 09:43

## 2025-04-30 RX ADMIN — Medication 4 MILLIGRAM(S): at 09:42

## 2025-04-30 RX ADMIN — KETOROLAC TROMETHAMINE 30 MILLIGRAM(S): 30 INJECTION, SOLUTION INTRAMUSCULAR; INTRAVENOUS at 09:42

## 2025-04-30 NOTE — ED PROVIDER NOTE - PATIENT PORTAL LINK FT
You can access the FollowMyHealth Patient Portal offered by [facility] by registering at the following website: [email or web address]. By joining [employer]’s FollowMyHealth portal, you will also be able to view your health information using other applications (apps) compatible with our system.

## 2025-04-30 NOTE — ED ADULT NURSE NOTE - CHIEF COMPLAINT QUOTE
pt states ,'I've been having migraines for three days now and I woke up up blurry vision that lasted for 20 minutes. This has happened three times in the last month." endorses nausea

## 2025-04-30 NOTE — ED PROVIDER NOTE - OTHER RECORDS SUMMARY FREE TEXT FOR MDM OBTAINED AND REVIEWED OLD RECORDS QUESTION
Letter sent as mychart unread.     chart review with prior workup done, pt states that she had normal CT and MRI, neuro eval, for her migraines  PCP Dr. Shaw

## 2025-04-30 NOTE — ED PROVIDER NOTE - CLINICAL SUMMARY MEDICAL DECISION MAKING FREE TEXT BOX
no focal neuro deficits, not concerned for stroke at this time, likely 2/2 unremitting migraine  will plan for IV placement, IV fluids, IV antiemetics, and treatment of migraine w/ toradol, benadryl, and magnesium. Will also trial atomized lidocaine given area of pain at this time

## 2025-04-30 NOTE — ED PROVIDER NOTE - NSFOLLOWUPINSTRUCTIONS_ED_ALL_ED_FT
Please continue to follow up closely with your neurologist.     Migraine Headache    A migraine headache is a very strong throbbing pain on one side or both sides of your head. Migraines can also cause other symptoms. Talk with your doctor about what things may bring on (trigger) your migraine headaches.    Follow these instructions at home:  Medicines     Take over-the-counter and prescription medicines only as told by your doctor.  Do not drive or use heavy machinery while taking prescription pain medicine.  To prevent or treat constipation while you are taking prescription pain medicine, your doctor may recommend that you:  Drink enough fluid to keep your pee (urine) clear or pale yellow.  Take over-the-counter or prescription medicines.  Eat foods that are high in fiber. These include fresh fruits and vegetables, whole grains, and beans.  Limit foods that are high in fat and processed sugars. These include fried and sweet foods.  Lifestyle     Avoid alcohol.  Do not use any products that contain nicotine or tobacco, such as cigarettes and e-cigarettes. If you need help quitting, ask your doctor.  Get at least 8 hours of sleep every night.  Limit your stress.  General instructions     Image   Keep a journal to find out what may bring on your migraines. For example, write down:  What you eat and drink.  How much sleep you get.  Any change in what you eat or drink.  Any change in your medicines.  If you have a migraine:  Avoid things that make your symptoms worse, such as bright lights.  It may help to lie down in a dark, quiet room.  Do not drive or use heavy machinery.  Ask your doctor what activities are safe for you.  Keep all follow-up visits as told by your doctor. This is important.  Contact a doctor if:  You get a migraine that is different or worse than your usual migraines.  Get help right away if:  Your migraine gets very bad.  You have a fever.  You have a stiff neck.  You have trouble seeing.  Your muscles feel weak or like you cannot control them.  You start to lose your balance a lot.  You start to have trouble walking.  You pass out (faint).  This information is not intended to replace advice given to you by your health care provider. Make sure you discuss any questions you have with your health care provider.    Document Released: 09/26/2009 Document Revised: 07/07/2017 Document Reviewed: 06/05/2017  [employer] Patient Education © 2019 [employer]

## 2025-04-30 NOTE — ED PROVIDER NOTE - OBJECTIVE STATEMENT
34 y/o female with PMHx of chronic headaches, migraines, depression, asthma and vertigo here w/ several days of migraine, refractory to motrin, benadryl, triptans. woke up this am w/ blurred vision in both eyes, felt like she had drops in them, lasted approx 20 min. +nausea. No longer w/ any neuro symptoms. Pt pain is most severe in front of face.

## 2025-04-30 NOTE — ED PROVIDER NOTE - PHYSICAL EXAMINATION
CONSTITUTIONAL: Well-appearing; well-nourished; in no apparent distress.   HEAD: Normocephalic; atraumatic.   EYES:  conjunctiva and sclera clear  ENT: normal nose; no rhinorrhea;  NECK: Supple; full ROM  RESPIRATORY: Breathing easily; no resp difficulty  EXT: No cyanosis or edema;  SKIN: Normal for age and race; warm; dry; good turgor; no apparent lesions or rash.   NEURO: A & O x 3; face symmetric; speech clear, CN 2-12 intact, motor/sensory intact, gait steady  PSYCHOLOGICAL: The patient’s mood and manner are appropriate.

## 2025-04-30 NOTE — ED ADULT TRIAGE NOTE - CHIEF COMPLAINT QUOTE
pt states ,'I've been having migraines for three days now and I woke up up blurry vision. This has happened three times in the last month." endorses nausea pt states ,'I've been having migraines for three days now and I woke up up blurry vision that lasted for 20 minutes. This has happened three times in the last month." endorses nausea

## 2025-04-30 NOTE — ED ADULT NURSE NOTE - NSFALLUNIVINTERV_ED_ALL_ED
Pain 60 tablet 0    ranitidine (ZANTAC) 300 MG tablet Take 1 tablet by mouth nightly (Patient not taking: Reported on 6/16/2020) 30 tablet 3    predniSONE (DELTASONE) 20 MG tablet Please take 2 tablets x 5 days (Patient not taking: Reported on 6/16/2020) 10 tablet 0    Fluticasone-Salmeterol 113-14 MCG/ACT AEPB Inhale 1 puff into the lungs 2 times daily 1 each 5    budesonide-formoterol (SYMBICORT) 160-4.5 MCG/ACT AERO Inhale 2 puffs into the lungs 2 times daily 1 Inhaler 5    ondansetron (ZOFRAN ODT) 4 MG disintegrating tablet Take 1 tablet by mouth every 8 hours as needed for Nausea or Vomiting 20 tablet 0    albuterol sulfate HFA (PROAIR HFA) 108 (90 Base) MCG/ACT inhaler Inhale 2 puffs into the lungs every 4 hours as needed for Wheezing 1 Inhaler 0     No current facility-administered medications for this visit. ROS:  No tremor, gait nl    MSE:  A-casually dressed, good EC, pleasant and engageable  A-full  M-euthymic  S-grossly A + O  I/J-fair/fair  T-linear, goal-directed. Speech c nl r/t/v/a. No resp to int stim. Grossly A + O.     45 y.o.      1.  Mood d/o NOS, Anxiety/PTSD-Stable. We'll see how pt does getting off abilify, matilda for metabolic issues:  Drop to 7.5 qd for 2 weeks, than off. Meanwhile, we'll try geodon 40 mg x 2 weeks, then 80 qhs c food. We'll try amitrip instead 50 qhs.   But she'll keep trazodone for emergencies.    We'll cont pristiq 50 qd, for mood and energy.  R/b/a d/w pt including metabolic trouble, DM, cholesterol, dyskinesia, etc.  We'll see about PTSD clinic in the future.  Lipids and hba1c done in Feb.  I've referred pt to [address] [place] [address] for Stress.     I have reviewed pt's genesight, she has a number of metabolic issues and MTHFR deficiency.     2.  Drug use d/o, severe-f/u at [facility] methadone clinic.  On methadone for now.       3.  MTHFR deficiency-couldn't afford deplin.     On abilify, zyprexa, tegretol, wellbutrin, celexa, klonopin, hydroxyzine, lamictal, trazodone in past.       I have spent >25 mins with this patient on working on coping skills and med mgt, and > 1/2 of that time was spent counseling this pt. Bed/Stretcher in lowest position, wheels locked, appropriate side rails in place/Call bell, personal items and telephone in reach/Instruct patient to call for assistance before getting out of bed/chair/stretcher/Non-slip footwear applied when patient is off stretcher/[place] to call system/Physically safe environment - no spills, clutter or unnecessary equipment/Purposeful proactive rounding/Room/bathroom lighting operational, light cord in reach

## 2025-04-30 NOTE — ED ADULT NURSE NOTE - CHPI ED NUR SYMPTOMS NEG
no change in level of consciousness/no confusion/no dizziness/no fever/no loss of consciousness/no numbness/no vomiting

## 2025-04-30 NOTE — ED ADULT NURSE NOTE - BEFAST SPEECH SLURRED
Detail Level: Detailed
Wound Evaluated By: KHADAR Alicia RN
Additional Comments: Duoderm applied to lateral portion of wound.
No

## 2025-04-30 NOTE — ED ADULT NURSE NOTE - OBJECTIVE STATEMENT
39yoF PMH depression and migraines came to ED c/o migraine x3 days. Pt states " I usually have my migraines in my L eye, this time it has been pressure in both of my eyes". Pt woke p this am around 0400 r/t pain, states when she woke up she had bilateral eye blurriness. " It felt like when you put eye drops in your eyes". Pt denies blurry vision in ED, no numbness tingling, weakness. Ambulates with steady gait.  No other neuro def noted in ED.

## 2025-05-02 DIAGNOSIS — F32.A DEPRESSION, UNSPECIFIED: ICD-10-CM

## 2025-05-02 DIAGNOSIS — Z88.8 ALLERGY STATUS TO OTHER DRUGS, MEDICAMENTS AND BIOLOGICAL SUBSTANCES: ICD-10-CM

## 2025-05-02 DIAGNOSIS — R11.0 NAUSEA: ICD-10-CM

## 2025-05-02 DIAGNOSIS — J45.909 UNSPECIFIED ASTHMA, UNCOMPLICATED: ICD-10-CM

## 2025-05-02 DIAGNOSIS — R51.9 HEADACHE, UNSPECIFIED: ICD-10-CM

## 2025-05-02 DIAGNOSIS — G43.901 MIGRAINE, UNSPECIFIED, NOT INTRACTABLE, WITH STATUS MIGRAINOSUS: ICD-10-CM

## 2025-05-02 DIAGNOSIS — Z88.2 ALLERGY STATUS TO SULFONAMIDES: ICD-10-CM

## 2025-05-21 NOTE — ED ADULT NURSE NOTE - IN ACCORDANCE WITH [place] LAW, WE OFFER EVERY PATIENT A HEPATITIS C TEST. WOULD YOU LIKE TO BE TESTED TODAY?
Daily Note     Today's date: 2025  Patient name: Steffanie Hahn  : 1957  MRN: [id number]  Referring provider: Sergio Waters PA-C  Dx:   Encounter Diagnosis     ICD-10-CM    1. Internal derangement of left shoulder  M24.812                      Subjective: Pt reports that the L side of her neck is sore in the mornings but it resolves after. She states that her shoulder continues to be the same.       Objective: See treatment diary below      Assessment: Tolerated treatment well. Despite mobilization at the cervical, no change in local mobility or pain with shoulder motion noted. Manual therapy at the shoulder did not change symptoms either. Trialed exercise therapy. Pt noted a significant improvement in symptoms, with full resolution of all pain with asterisks following banded ER. Updated HEP. Patient would benefit from continued PT      Plan: Continue per plan of care.      Precautions: hx of colon cancer (remission for 9 years)    POC expires Unit limit Auth Expiration date PT/OT/ST + Visit Limit?   25                              Visit/Unit Tracking  AUTH Status:  Date                Used                Remaining                    Manuals           CPA/UPA  L C6 UPA gd 4- to 4+ L C6 UPA gd 4- to 4+          GHJ mob   Post ESTELA gd 4-                                    Neuro Re-Ed                                                                                                        Ther Ex             Pt edu ESTELA PALMER ESTELA          Supine cervical retraction  20x           Seated cervical L ROT with self OP  10x HEP           Banded ER   YTB 2x10                                                              Ther Activity                                       Gait Training                                       Modalities                                                
Opt out

## 2025-06-10 ENCOUNTER — APPOINTMENT (OUTPATIENT)
Dept: GASTROENTEROLOGY | Facility: CLINIC | Age: 40
End: 2025-06-10

## 2025-07-03 ENCOUNTER — APPOINTMENT (OUTPATIENT)
Dept: INTERNAL MEDICINE | Facility: CLINIC | Age: 40
End: 2025-07-03
Payer: COMMERCIAL

## 2025-07-03 PROCEDURE — 97803 MED NUTRITION INDIV SUBSEQ: CPT | Mod: 95

## 2025-08-11 ENCOUNTER — APPOINTMENT (OUTPATIENT)
Dept: INTERNAL MEDICINE | Facility: CLINIC | Age: 40
End: 2025-08-11
Payer: COMMERCIAL

## 2025-08-11 PROCEDURE — 97803 MED NUTRITION INDIV SUBSEQ: CPT | Mod: 95

## 2025-08-21 ENCOUNTER — APPOINTMENT (OUTPATIENT)
Dept: INTERNAL MEDICINE | Facility: CLINIC | Age: 40
End: 2025-08-21
Payer: COMMERCIAL

## 2025-08-21 DIAGNOSIS — F50.810 BINGE EATING DISORDER, MILD: ICD-10-CM

## 2025-08-21 PROCEDURE — 97803 MED NUTRITION INDIV SUBSEQ: CPT | Mod: 95

## 2025-08-28 ENCOUNTER — APPOINTMENT (OUTPATIENT)
Dept: INTERNAL MEDICINE | Facility: CLINIC | Age: 40
End: 2025-08-28

## 2025-09-10 ENCOUNTER — APPOINTMENT (OUTPATIENT)
Dept: NEUROLOGY | Facility: CLINIC | Age: 40
End: 2025-09-10